# Patient Record
Sex: FEMALE | Race: BLACK OR AFRICAN AMERICAN | NOT HISPANIC OR LATINO | URBAN - METROPOLITAN AREA
[De-identification: names, ages, dates, MRNs, and addresses within clinical notes are randomized per-mention and may not be internally consistent; named-entity substitution may affect disease eponyms.]

---

## 2017-08-28 ENCOUNTER — EMERGENCY (EMERGENCY)
Facility: HOSPITAL | Age: 27
LOS: 1 days | Discharge: HOME | End: 2017-08-28
Attending: EMERGENCY MEDICINE | Admitting: EMERGENCY MEDICINE
Payer: COMMERCIAL

## 2017-08-28 VITALS
HEART RATE: 101 BPM | DIASTOLIC BLOOD PRESSURE: 89 MMHG | TEMPERATURE: 98 F | SYSTOLIC BLOOD PRESSURE: 129 MMHG | OXYGEN SATURATION: 100 % | RESPIRATION RATE: 16 BRPM

## 2017-08-28 LAB
ALBUMIN SERPL ELPH-MCNC: 4 G/DL — SIGNIFICANT CHANGE UP (ref 3.3–5)
ALP SERPL-CCNC: 67 U/L — SIGNIFICANT CHANGE UP (ref 40–120)
ALT FLD-CCNC: 21 U/L — SIGNIFICANT CHANGE UP (ref 4–33)
APPEARANCE UR: SIGNIFICANT CHANGE UP
AST SERPL-CCNC: 24 U/L — SIGNIFICANT CHANGE UP (ref 4–32)
BACTERIA # UR AUTO: SIGNIFICANT CHANGE UP
BASE EXCESS BLDV CALC-SCNC: -0.4 MMOL/L — SIGNIFICANT CHANGE UP
BASOPHILS # BLD AUTO: 0.03 K/UL — SIGNIFICANT CHANGE UP (ref 0–0.2)
BASOPHILS NFR BLD AUTO: 0.4 % — SIGNIFICANT CHANGE UP (ref 0–2)
BILIRUB SERPL-MCNC: 0.5 MG/DL — SIGNIFICANT CHANGE UP (ref 0.2–1.2)
BILIRUB UR-MCNC: NEGATIVE — SIGNIFICANT CHANGE UP
BLOOD GAS VENOUS - CREATININE: 0.62 MG/DL — SIGNIFICANT CHANGE UP (ref 0.5–1.3)
BLOOD UR QL VISUAL: HIGH
BUN SERPL-MCNC: 10 MG/DL — SIGNIFICANT CHANGE UP (ref 7–23)
CALCIUM SERPL-MCNC: 9.4 MG/DL — SIGNIFICANT CHANGE UP (ref 8.4–10.5)
CHLORIDE BLDV-SCNC: 104 MMOL/L — SIGNIFICANT CHANGE UP (ref 96–108)
CHLORIDE SERPL-SCNC: 98 MMOL/L — SIGNIFICANT CHANGE UP (ref 98–107)
CO2 SERPL-SCNC: 21 MMOL/L — LOW (ref 22–31)
COLOR SPEC: YELLOW — SIGNIFICANT CHANGE UP
CREAT SERPL-MCNC: 0.68 MG/DL — SIGNIFICANT CHANGE UP (ref 0.5–1.3)
EOSINOPHIL # BLD AUTO: 0.29 K/UL — SIGNIFICANT CHANGE UP (ref 0–0.5)
EOSINOPHIL NFR BLD AUTO: 3.6 % — SIGNIFICANT CHANGE UP (ref 0–6)
GAS PNL BLDV: 133 MMOL/L — LOW (ref 136–146)
GLUCOSE BLDV-MCNC: 101 — HIGH (ref 70–99)
GLUCOSE SERPL-MCNC: 90 MG/DL — SIGNIFICANT CHANGE UP (ref 70–99)
GLUCOSE UR-MCNC: NEGATIVE — SIGNIFICANT CHANGE UP
HCO3 BLDV-SCNC: 22 MMOL/L — SIGNIFICANT CHANGE UP (ref 20–27)
HCT VFR BLD CALC: 41.5 % — SIGNIFICANT CHANGE UP (ref 34.5–45)
HCT VFR BLDV CALC: 43.3 % — SIGNIFICANT CHANGE UP (ref 34.5–45)
HGB BLD-MCNC: 13.5 G/DL — SIGNIFICANT CHANGE UP (ref 11.5–15.5)
HGB BLDV-MCNC: 14.1 G/DL — SIGNIFICANT CHANGE UP (ref 11.5–15.5)
IMM GRANULOCYTES # BLD AUTO: 0.01 # — SIGNIFICANT CHANGE UP
IMM GRANULOCYTES NFR BLD AUTO: 0.1 % — SIGNIFICANT CHANGE UP (ref 0–1.5)
KETONES UR-MCNC: SIGNIFICANT CHANGE UP
LACTATE BLDV-MCNC: 1.3 MMOL/L — SIGNIFICANT CHANGE UP (ref 0.5–2)
LEUKOCYTE ESTERASE UR-ACNC: HIGH
LYMPHOCYTES # BLD AUTO: 2.53 K/UL — SIGNIFICANT CHANGE UP (ref 1–3.3)
LYMPHOCYTES # BLD AUTO: 31.7 % — SIGNIFICANT CHANGE UP (ref 13–44)
MCHC RBC-ENTMCNC: 30.7 PG — SIGNIFICANT CHANGE UP (ref 27–34)
MCHC RBC-ENTMCNC: 32.5 % — SIGNIFICANT CHANGE UP (ref 32–36)
MCV RBC AUTO: 94.3 FL — SIGNIFICANT CHANGE UP (ref 80–100)
MONOCYTES # BLD AUTO: 0.42 K/UL — SIGNIFICANT CHANGE UP (ref 0–0.9)
MONOCYTES NFR BLD AUTO: 5.3 % — SIGNIFICANT CHANGE UP (ref 2–14)
MUCOUS THREADS # UR AUTO: SIGNIFICANT CHANGE UP
NEUTROPHILS # BLD AUTO: 4.69 K/UL — SIGNIFICANT CHANGE UP (ref 1.8–7.4)
NEUTROPHILS NFR BLD AUTO: 58.9 % — SIGNIFICANT CHANGE UP (ref 43–77)
NITRITE UR-MCNC: NEGATIVE — SIGNIFICANT CHANGE UP
NRBC # FLD: 0 — SIGNIFICANT CHANGE UP
PCO2 BLDV: 49 MMHG — SIGNIFICANT CHANGE UP (ref 41–51)
PH BLDV: 7.32 PH — SIGNIFICANT CHANGE UP (ref 7.32–7.43)
PH UR: 6 — SIGNIFICANT CHANGE UP (ref 4.6–8)
PLATELET # BLD AUTO: 279 K/UL — SIGNIFICANT CHANGE UP (ref 150–400)
PMV BLD: 11.8 FL — SIGNIFICANT CHANGE UP (ref 7–13)
PO2 BLDV: 28 MMHG — LOW (ref 35–40)
POTASSIUM BLDV-SCNC: 3.8 MMOL/L — SIGNIFICANT CHANGE UP (ref 3.4–4.5)
POTASSIUM SERPL-MCNC: 4 MMOL/L — SIGNIFICANT CHANGE UP (ref 3.5–5.3)
POTASSIUM SERPL-SCNC: 4 MMOL/L — SIGNIFICANT CHANGE UP (ref 3.5–5.3)
PROT SERPL-MCNC: 7.7 G/DL — SIGNIFICANT CHANGE UP (ref 6–8.3)
PROT UR-MCNC: 300 — HIGH
RBC # BLD: 4.4 M/UL — SIGNIFICANT CHANGE UP (ref 3.8–5.2)
RBC # FLD: 12.9 % — SIGNIFICANT CHANGE UP (ref 10.3–14.5)
RBC CASTS # UR COMP ASSIST: >50 — HIGH (ref 0–?)
SAO2 % BLDV: 41.5 % — LOW (ref 60–85)
SODIUM SERPL-SCNC: 134 MMOL/L — LOW (ref 135–145)
SP GR SPEC: 1.02 — SIGNIFICANT CHANGE UP (ref 1–1.03)
SQUAMOUS # UR AUTO: SIGNIFICANT CHANGE UP
UROBILINOGEN FLD QL: NORMAL E.U. — SIGNIFICANT CHANGE UP (ref 0.1–0.2)
WBC # BLD: 7.97 K/UL — SIGNIFICANT CHANGE UP (ref 3.8–10.5)
WBC # FLD AUTO: 7.97 K/UL — SIGNIFICANT CHANGE UP (ref 3.8–10.5)
WBC UR QL: SIGNIFICANT CHANGE UP (ref 0–?)

## 2017-08-28 PROCEDURE — 74176 CT ABD & PELVIS W/O CONTRAST: CPT | Mod: 26,GC

## 2017-08-28 PROCEDURE — 76830 TRANSVAGINAL US NON-OB: CPT | Mod: 26

## 2017-08-28 PROCEDURE — 99284 EMERGENCY DEPT VISIT MOD MDM: CPT | Mod: 25

## 2017-08-28 RX ORDER — KETOROLAC TROMETHAMINE 30 MG/ML
15 SYRINGE (ML) INJECTION ONCE
Qty: 0 | Refills: 0 | Status: DISCONTINUED | OUTPATIENT
Start: 2017-08-28 | End: 2017-08-28

## 2017-08-28 RX ORDER — ACETAMINOPHEN 500 MG
2 TABLET ORAL
Qty: 60 | Refills: 0 | OUTPATIENT
Start: 2017-08-28 | End: 2017-09-02

## 2017-08-28 RX ORDER — CEFTRIAXONE 500 MG/1
1 INJECTION, POWDER, FOR SOLUTION INTRAMUSCULAR; INTRAVENOUS ONCE
Qty: 0 | Refills: 0 | Status: COMPLETED | OUTPATIENT
Start: 2017-08-28 | End: 2017-08-28

## 2017-08-28 RX ORDER — ACETAMINOPHEN 500 MG
1000 TABLET ORAL ONCE
Qty: 0 | Refills: 0 | Status: COMPLETED | OUTPATIENT
Start: 2017-08-28 | End: 2017-08-28

## 2017-08-28 RX ORDER — SODIUM CHLORIDE 9 MG/ML
1000 INJECTION INTRAMUSCULAR; INTRAVENOUS; SUBCUTANEOUS ONCE
Qty: 0 | Refills: 0 | Status: COMPLETED | OUTPATIENT
Start: 2017-08-28 | End: 2017-08-28

## 2017-08-28 RX ADMIN — Medication 15 MILLIGRAM(S): at 06:39

## 2017-08-28 RX ADMIN — CEFTRIAXONE 100 GRAM(S): 500 INJECTION, POWDER, FOR SOLUTION INTRAMUSCULAR; INTRAVENOUS at 04:26

## 2017-08-28 RX ADMIN — Medication 400 MILLIGRAM(S): at 02:47

## 2017-08-28 RX ADMIN — Medication 1000 MILLIGRAM(S): at 04:26

## 2017-08-28 RX ADMIN — SODIUM CHLORIDE 1000 MILLILITER(S): 9 INJECTION INTRAMUSCULAR; INTRAVENOUS; SUBCUTANEOUS at 02:43

## 2017-08-28 NOTE — ED ADULT TRIAGE NOTE - CHIEF COMPLAINT QUOTE
pt was seen at [facility] today and prescribed macrobid for UTI. c.o left flank pain starting 1 hour ago. states urine has been bloody for the last few hours. took 650mg tylenol at 9pm. pmh asthma

## 2017-08-28 NOTE — ED ADULT NURSE NOTE - CHIEF COMPLAINT QUOTE
pt was seen at [facility] today and prescribed macrobid for UTI. c.o left flank pain starting 1 hour ago. states urine has been bloody for the last few hours. took 650mg tylenol at 9pm. pmh asthma

## 2017-08-28 NOTE — ED ADULT NURSE NOTE - OBJECTIVE STATEMENT
Patient bought in room 9, alert and oriented x 4, ambulatory, respirations are even and unlabored, left lower  to palpation, 20 gauge saline lock inserted on right AC, blood drawn and sent, urinalysis and urine culture sent. Medications administered as ordered. Will follow up and monitor.

## 2017-08-28 NOTE — ED PROVIDER NOTE - OBJECTIVE STATEMENT
27 YOF asthma and eczema recent UTI Dx at  today p/w left flank pain and hematuria since her visit earlier today.  No associated symptoms. No fevers, chills, sweats, weight loss, fatigue, or malaise.  No history of urolithiasis.  Pt has blood tinged urine at bedside.

## 2017-08-28 NOTE — ED PROVIDER NOTE - ATTENDING CONTRIBUTION TO CARE
DR. GUERRERO, ATTENDING MD-  I performed a face to face bedside interview with patient regarding history of present illness, review of symptoms and past medical history. I completed an independent physical exam.  I have discussed patient's plan of care with the resident.   Documentation as above in the note.   HPI: 28 yo F with asthma that presents with left flank pain. Currently being treated for UTI which she is taking macrobid from UC today. Hematuria as well. Flank pain colicky 9/10, on/off, radiates to groin.   EXAM: Well appearing, CVAT Left. Abd otherwise soft.   MDM: Worry for kidney stone vs pyelo. Will obtain labs, urine, and CT. Provide meds, reassess. DR. GUERRERO, ATTENDING MD-  I performed a face to face bedside interview with patient regarding history of present illness, review of symptoms and past medical history. I completed an independent physical exam.  I have discussed patient's plan of care with the resident.   Documentation as above in the note.   HPI: 28 yo F with asthma that presents with left flank pain. Currently being treated for UTI which she is taking macrobid from  today. Hematuria as well. Flank pain colicky 9/10, on/off, radiates to groin.   EXAM: Well appearing, CVAT Left. Abd otherwise soft.   MDM: Worry for kidney stone vs pyelo. Will obtain labs, urine, and CT. Provide meds, reassess.  CT without evidence stone. US shows no torsion but hemorrhagic cyst. Pt has OBGYN that she can f/u with. Will provide all labs and imaging results to pt to bring to PMD, OBGYN and rec to continue Abx given at  and rx'd pain meds PRN. return precautions.

## 2017-08-28 NOTE — ED PROVIDER NOTE - MEDICAL DECISION MAKING DETAILS
27 YOF asthma and eczema recent UTI Dx at  today p/w left flank pain and hematuria since her visit earlier today.  Tachycardia.  SIRS 1+.  +CVAT.  DDX UTI, pyelonephritis, urolithiasis, infected nephrolith.  CT renal stone hunt, evaluate for infection, evaluate renal function.  Treat symptomatically, resuscitate.  Reassess.

## 2017-08-28 NOTE — ED PROVIDER NOTE - CARE PLAN
Principal Discharge DX:	Hematuria Principal Discharge DX:	Ovarian cyst  Secondary Diagnosis:	Acute cystitis with hematuria

## 2017-08-29 LAB
BACTERIA UR CULT: SIGNIFICANT CHANGE UP
SPECIMEN SOURCE: SIGNIFICANT CHANGE UP

## 2018-04-06 ENCOUNTER — EMERGENCY (EMERGENCY)
Facility: HOSPITAL | Age: 28
LOS: 1 days | Discharge: HOME | End: 2018-04-06
Attending: EMERGENCY MEDICINE | Admitting: EMERGENCY MEDICINE
Payer: COMMERCIAL

## 2018-04-06 VITALS
SYSTOLIC BLOOD PRESSURE: 108 MMHG | TEMPERATURE: 98 F | HEART RATE: 95 BPM | DIASTOLIC BLOOD PRESSURE: 67 MMHG | RESPIRATION RATE: 18 BRPM

## 2018-04-06 LAB
ALBUMIN SERPL ELPH-MCNC: 3.2 G/DL — LOW (ref 3.3–5)
ALP SERPL-CCNC: 81 U/L — SIGNIFICANT CHANGE UP (ref 40–120)
ALT FLD-CCNC: 18 U/L — SIGNIFICANT CHANGE UP (ref 4–33)
AST SERPL-CCNC: 19 U/L — SIGNIFICANT CHANGE UP (ref 4–32)
BASE EXCESS BLDV CALC-SCNC: 1.7 MMOL/L — SIGNIFICANT CHANGE UP
BASOPHILS # BLD AUTO: 0.02 K/UL — SIGNIFICANT CHANGE UP (ref 0–0.2)
BASOPHILS NFR BLD AUTO: 0.2 % — SIGNIFICANT CHANGE UP (ref 0–2)
BILIRUB SERPL-MCNC: 0.4 MG/DL — SIGNIFICANT CHANGE UP (ref 0.2–1.2)
BLOOD GAS VENOUS - CREATININE: 0.57 MG/DL — SIGNIFICANT CHANGE UP (ref 0.5–1.3)
BUN SERPL-MCNC: 8 MG/DL — SIGNIFICANT CHANGE UP (ref 7–23)
CALCIUM SERPL-MCNC: 8.8 MG/DL — SIGNIFICANT CHANGE UP (ref 8.4–10.5)
CHLORIDE BLDV-SCNC: 104 MMOL/L — SIGNIFICANT CHANGE UP (ref 96–108)
CHLORIDE SERPL-SCNC: 101 MMOL/L — SIGNIFICANT CHANGE UP (ref 98–107)
CO2 SERPL-SCNC: 24 MMOL/L — SIGNIFICANT CHANGE UP (ref 22–31)
CREAT SERPL-MCNC: 0.75 MG/DL — SIGNIFICANT CHANGE UP (ref 0.5–1.3)
EOSINOPHIL # BLD AUTO: 0.17 K/UL — SIGNIFICANT CHANGE UP (ref 0–0.5)
EOSINOPHIL NFR BLD AUTO: 1.4 % — SIGNIFICANT CHANGE UP (ref 0–6)
GAS PNL BLDV: 136 MMOL/L — SIGNIFICANT CHANGE UP (ref 136–146)
GLUCOSE BLDV-MCNC: 122 — HIGH (ref 70–99)
GLUCOSE SERPL-MCNC: 126 MG/DL — HIGH (ref 70–99)
HCG SERPL-ACNC: < 5 MIU/ML — SIGNIFICANT CHANGE UP
HCO3 BLDV-SCNC: 24 MMOL/L — SIGNIFICANT CHANGE UP (ref 20–27)
HCT VFR BLD CALC: 36.9 % — SIGNIFICANT CHANGE UP (ref 34.5–45)
HCT VFR BLDV CALC: 40.6 % — SIGNIFICANT CHANGE UP (ref 34.5–45)
HGB BLD-MCNC: 11.8 G/DL — SIGNIFICANT CHANGE UP (ref 11.5–15.5)
HGB BLDV-MCNC: 13.2 G/DL — SIGNIFICANT CHANGE UP (ref 11.5–15.5)
IMM GRANULOCYTES # BLD AUTO: 0.05 # — SIGNIFICANT CHANGE UP
IMM GRANULOCYTES NFR BLD AUTO: 0.4 % — SIGNIFICANT CHANGE UP (ref 0–1.5)
LACTATE BLDV-MCNC: 2.1 MMOL/L — HIGH (ref 0.5–2)
LYMPHOCYTES # BLD AUTO: 29.7 % — SIGNIFICANT CHANGE UP (ref 13–44)
LYMPHOCYTES # BLD AUTO: 3.5 K/UL — HIGH (ref 1–3.3)
MCHC RBC-ENTMCNC: 30.6 PG — SIGNIFICANT CHANGE UP (ref 27–34)
MCHC RBC-ENTMCNC: 32 % — SIGNIFICANT CHANGE UP (ref 32–36)
MCV RBC AUTO: 95.6 FL — SIGNIFICANT CHANGE UP (ref 80–100)
MONOCYTES # BLD AUTO: 0.68 K/UL — SIGNIFICANT CHANGE UP (ref 0–0.9)
MONOCYTES NFR BLD AUTO: 5.8 % — SIGNIFICANT CHANGE UP (ref 2–14)
NEUTROPHILS # BLD AUTO: 7.36 K/UL — SIGNIFICANT CHANGE UP (ref 1.8–7.4)
NEUTROPHILS NFR BLD AUTO: 62.5 % — SIGNIFICANT CHANGE UP (ref 43–77)
NRBC # FLD: 0 — SIGNIFICANT CHANGE UP
PCO2 BLDV: 46 MMHG — SIGNIFICANT CHANGE UP (ref 41–51)
PH BLDV: 7.38 PH — SIGNIFICANT CHANGE UP (ref 7.32–7.43)
PLATELET # BLD AUTO: 352 K/UL — SIGNIFICANT CHANGE UP (ref 150–400)
PMV BLD: 10.9 FL — SIGNIFICANT CHANGE UP (ref 7–13)
PO2 BLDV: 29 MMHG — LOW (ref 35–40)
POTASSIUM BLDV-SCNC: 3.8 MMOL/L — SIGNIFICANT CHANGE UP (ref 3.4–4.5)
POTASSIUM SERPL-MCNC: 4 MMOL/L — SIGNIFICANT CHANGE UP (ref 3.5–5.3)
POTASSIUM SERPL-SCNC: 4 MMOL/L — SIGNIFICANT CHANGE UP (ref 3.5–5.3)
PROT SERPL-MCNC: 7 G/DL — SIGNIFICANT CHANGE UP (ref 6–8.3)
RBC # BLD: 3.86 M/UL — SIGNIFICANT CHANGE UP (ref 3.8–5.2)
RBC # FLD: 13.1 % — SIGNIFICANT CHANGE UP (ref 10.3–14.5)
SAO2 % BLDV: 47.7 % — LOW (ref 60–85)
SODIUM SERPL-SCNC: 138 MMOL/L — SIGNIFICANT CHANGE UP (ref 135–145)
WBC # BLD: 11.78 K/UL — HIGH (ref 3.8–10.5)
WBC # FLD AUTO: 11.78 K/UL — HIGH (ref 3.8–10.5)

## 2018-04-06 PROCEDURE — 99285 EMERGENCY DEPT VISIT HI MDM: CPT | Mod: 25

## 2018-04-06 PROCEDURE — 76641 ULTRASOUND BREAST COMPLETE: CPT | Mod: 26,RT

## 2018-04-06 RX ORDER — OXYCODONE AND ACETAMINOPHEN 5; 325 MG/1; MG/1
1 TABLET ORAL ONCE
Qty: 0 | Refills: 0 | Status: DISCONTINUED | OUTPATIENT
Start: 2018-04-06 | End: 2018-04-06

## 2018-04-06 RX ORDER — OXYCODONE AND ACETAMINOPHEN 5; 325 MG/1; MG/1
1 TABLET ORAL EVERY 4 HOURS
Qty: 0 | Refills: 0 | Status: DISCONTINUED | OUTPATIENT
Start: 2018-04-06 | End: 2018-04-06

## 2018-04-06 RX ADMIN — OXYCODONE AND ACETAMINOPHEN 1 TABLET(S): 5; 325 TABLET ORAL at 21:45

## 2018-04-06 RX ADMIN — OXYCODONE AND ACETAMINOPHEN 1 TABLET(S): 5; 325 TABLET ORAL at 22:45

## 2018-04-06 NOTE — ED ADULT NURSE NOTE - CHIEF COMPLAINT
The patient is a 27y Female complaining of mass on right breast noticed x2 days with pain and discharge from nipple around piercing. Reports was told she needed a biopsy.

## 2018-04-06 NOTE — ED ADULT NURSE NOTE - OBJECTIVE STATEMENT
Received pt in intake 9, pt A&Ox4, respirations even and unlabored b/l. +large mass on right breast. IVL 20g Angiocath placed on left AC. labs sent. Medicated as per provider order. Will continue to monitor.

## 2018-04-06 NOTE — ED ADULT TRIAGE NOTE - CHIEF COMPLAINT QUOTE
Pt found a mass in her rt breast 3 days ago and went to [facility] who then sent her for a sonogram yesterday which showed a large mass. Pt was scheduled for biopsy this am and missed the appt  Pt states she is worried and the pain in the breast is increasing

## 2018-04-06 NOTE — ED PROVIDER NOTE - OBJECTIVE STATEMENT
Pt is a 26 y/o F w/ NKDA, no medications, no PMHX, no PSHX who presents w/ a painful mass on her R breast x2 days. She notes associated chills, nausea, and vomiting that started today. Pt has a piercing to the R nipple and reports that there is discharge coming from the nipple. She had a sonogram yesterday that found a large mass and was recommended to get a biopsy. Denies fever or other complaints. 26 y/o F no PMHX presents w/ a painful mass on her R breast x2 days. She notes associated chills, and nausea that started today. Pt has a piercing to the R nipple and reports that there is discharge coming from the nipple. She had a sonogram yesterday that found a large mass? and was recommended to get a biopsy today. Pt missed appointment this morning. Pt presents to ED for further eval.  Denies fever or other complaints.

## 2018-04-06 NOTE — ED PROVIDER NOTE - PROGRESS NOTE DETAILS
SINA Elizabeth- received sign out from SINA Shah.  Breast US showing fluid collection- Surgery called for consult and at bedside. SINA Smith- Surgery still at bedside. Signed out to CDU SINA Guevara to follow up on DC plan SINA Smith- Surgery needle aspirated- obtained a good amount of fluids. Recommends CDU overnight for IV ABX and reassessment in am.  Spoke with SINA Guevara- accepted to CDU. Clinda ordered.

## 2018-04-06 NOTE — ED PROVIDER NOTE - PHYSICAL EXAMINATION
Chaperon- SINA Jang  Right Breast: positive tenderness to breast with positive mass to inferior breast at around 6 oclock, no nipple discharge noted, positive induration

## 2018-04-06 NOTE — ED PROVIDER NOTE - ATTENDING CONTRIBUTION TO CARE
27F no sig pmh with painful mass R breast x 2 days with chills, nausea. +Purulent discharge from around nipple ring. +FH breast CA in cousin and aunt.    PE: NAD, NCAT, MMM, Trachea midline, Normal conjunctiva, lungs CTAB, S1/S2 RRR, Normal perfusion, 2+ radial pulses bilat, Abdomen Soft, NTND, No rebound/guarding, No LE edema, No deformity of extremities, No rashes,  No focal motor or sensory deficits. R breast with area of firmness posterior to and inferior to areola, no discharge, no erythema, no warmth (beulah Shah).    27F with R breast region firmness, pain. Concerning for abscess vs mass. To obtain CBC eval for anemia, leukocytosis, cmp eval for metabolic derangement. Draw blood cultures. US breast. Analgesia. Likely surgery consult. - Donald Le MD

## 2018-04-06 NOTE — ED PROVIDER NOTE - MEDICAL DECISION MAKING DETAILS
26 y/o F no PMHX presents w/ a painful mass on her R breast x2 days. : r/o abscess vs mass: cbc, cmp, pain control, US

## 2018-04-07 VITALS
OXYGEN SATURATION: 100 % | HEART RATE: 92 BPM | SYSTOLIC BLOOD PRESSURE: 100 MMHG | RESPIRATION RATE: 16 BRPM | TEMPERATURE: 98 F | DIASTOLIC BLOOD PRESSURE: 68 MMHG

## 2018-04-07 LAB
ALBUMIN SERPL ELPH-MCNC: 2.9 G/DL — LOW (ref 3.3–5)
ALP SERPL-CCNC: 69 U/L — SIGNIFICANT CHANGE UP (ref 40–120)
ALT FLD-CCNC: 15 U/L — SIGNIFICANT CHANGE UP (ref 4–33)
AST SERPL-CCNC: 13 U/L — SIGNIFICANT CHANGE UP (ref 4–32)
BASE EXCESS BLDV CALC-SCNC: 1.8 MMOL/L — SIGNIFICANT CHANGE UP
BASOPHILS # BLD AUTO: 0.02 K/UL — SIGNIFICANT CHANGE UP (ref 0–0.2)
BASOPHILS NFR BLD AUTO: 0.2 % — SIGNIFICANT CHANGE UP (ref 0–2)
BILIRUB SERPL-MCNC: 0.5 MG/DL — SIGNIFICANT CHANGE UP (ref 0.2–1.2)
BLOOD GAS VENOUS - CREATININE: 0.82 MG/DL — SIGNIFICANT CHANGE UP (ref 0.5–1.3)
BUN SERPL-MCNC: 12 MG/DL — SIGNIFICANT CHANGE UP (ref 7–23)
CALCIUM SERPL-MCNC: 8.5 MG/DL — SIGNIFICANT CHANGE UP (ref 8.4–10.5)
CHLORIDE BLDV-SCNC: 104 MMOL/L — SIGNIFICANT CHANGE UP (ref 96–108)
CHLORIDE SERPL-SCNC: 101 MMOL/L — SIGNIFICANT CHANGE UP (ref 98–107)
CO2 SERPL-SCNC: 23 MMOL/L — SIGNIFICANT CHANGE UP (ref 22–31)
CREAT SERPL-MCNC: 0.87 MG/DL — SIGNIFICANT CHANGE UP (ref 0.5–1.3)
EOSINOPHIL # BLD AUTO: 0.23 K/UL — SIGNIFICANT CHANGE UP (ref 0–0.5)
EOSINOPHIL NFR BLD AUTO: 1.9 % — SIGNIFICANT CHANGE UP (ref 0–6)
GAS PNL BLDV: 131 MMOL/L — LOW (ref 136–146)
GLUCOSE BLDV-MCNC: 112 — HIGH (ref 70–99)
GLUCOSE SERPL-MCNC: 124 MG/DL — HIGH (ref 70–99)
GRAM STN SPEC: SIGNIFICANT CHANGE UP
HBA1C BLD-MCNC: 5 % — SIGNIFICANT CHANGE UP (ref 4–5.6)
HCO3 BLDV-SCNC: 25 MMOL/L — SIGNIFICANT CHANGE UP (ref 20–27)
HCT VFR BLD CALC: 33.4 % — LOW (ref 34.5–45)
HCT VFR BLDV CALC: 33.9 % — LOW (ref 34.5–45)
HGB BLD-MCNC: 10.9 G/DL — LOW (ref 11.5–15.5)
HGB BLDV-MCNC: 11 G/DL — LOW (ref 11.5–15.5)
IMM GRANULOCYTES # BLD AUTO: 0.04 # — SIGNIFICANT CHANGE UP
IMM GRANULOCYTES NFR BLD AUTO: 0.3 % — SIGNIFICANT CHANGE UP (ref 0–1.5)
LACTATE BLDV-MCNC: 0.9 MMOL/L — SIGNIFICANT CHANGE UP (ref 0.5–2)
LYMPHOCYTES # BLD AUTO: 35 % — SIGNIFICANT CHANGE UP (ref 13–44)
LYMPHOCYTES # BLD AUTO: 4.17 K/UL — HIGH (ref 1–3.3)
MCHC RBC-ENTMCNC: 31.5 PG — SIGNIFICANT CHANGE UP (ref 27–34)
MCHC RBC-ENTMCNC: 32.6 % — SIGNIFICANT CHANGE UP (ref 32–36)
MCV RBC AUTO: 96.5 FL — SIGNIFICANT CHANGE UP (ref 80–100)
MONOCYTES # BLD AUTO: 0.9 K/UL — SIGNIFICANT CHANGE UP (ref 0–0.9)
MONOCYTES NFR BLD AUTO: 7.6 % — SIGNIFICANT CHANGE UP (ref 2–14)
NEUTROPHILS # BLD AUTO: 6.54 K/UL — SIGNIFICANT CHANGE UP (ref 1.8–7.4)
NEUTROPHILS NFR BLD AUTO: 55 % — SIGNIFICANT CHANGE UP (ref 43–77)
NRBC # FLD: 0 — SIGNIFICANT CHANGE UP
PCO2 BLDV: 51 MMHG — SIGNIFICANT CHANGE UP (ref 41–51)
PH BLDV: 7.34 PH — SIGNIFICANT CHANGE UP (ref 7.32–7.43)
PLATELET # BLD AUTO: 305 K/UL — SIGNIFICANT CHANGE UP (ref 150–400)
PMV BLD: 10.8 FL — SIGNIFICANT CHANGE UP (ref 7–13)
PO2 BLDV: 48 MMHG — HIGH (ref 35–40)
POTASSIUM BLDV-SCNC: 3.7 MMOL/L — SIGNIFICANT CHANGE UP (ref 3.4–4.5)
POTASSIUM SERPL-MCNC: 3.9 MMOL/L — SIGNIFICANT CHANGE UP (ref 3.5–5.3)
POTASSIUM SERPL-SCNC: 3.9 MMOL/L — SIGNIFICANT CHANGE UP (ref 3.5–5.3)
PROT SERPL-MCNC: 6.1 G/DL — SIGNIFICANT CHANGE UP (ref 6–8.3)
RBC # BLD: 3.46 M/UL — LOW (ref 3.8–5.2)
RBC # FLD: 12.8 % — SIGNIFICANT CHANGE UP (ref 10.3–14.5)
SAO2 % BLDV: 76.8 % — SIGNIFICANT CHANGE UP (ref 60–85)
SODIUM SERPL-SCNC: 136 MMOL/L — SIGNIFICANT CHANGE UP (ref 135–145)
SPECIMEN SOURCE: SIGNIFICANT CHANGE UP
WBC # BLD: 11.9 K/UL — HIGH (ref 3.8–10.5)
WBC # FLD AUTO: 11.9 K/UL — HIGH (ref 3.8–10.5)

## 2018-04-07 PROCEDURE — 99219: CPT

## 2018-04-07 RX ORDER — OXYCODONE AND ACETAMINOPHEN 5; 325 MG/1; MG/1
1 TABLET ORAL ONCE
Qty: 0 | Refills: 0 | Status: DISCONTINUED | OUTPATIENT
Start: 2018-04-07 | End: 2018-04-07

## 2018-04-07 RX ORDER — IBUPROFEN 200 MG
1 TABLET ORAL
Qty: 21 | Refills: 0 | OUTPATIENT
Start: 2018-04-07 | End: 2018-04-13

## 2018-04-07 RX ORDER — KETOROLAC TROMETHAMINE 30 MG/ML
30 SYRINGE (ML) INJECTION ONCE
Qty: 0 | Refills: 0 | Status: DISCONTINUED | OUTPATIENT
Start: 2018-04-07 | End: 2018-04-07

## 2018-04-07 RX ADMIN — Medication 30 MILLIGRAM(S): at 02:37

## 2018-04-07 RX ADMIN — Medication 100 MILLIGRAM(S): at 07:02

## 2018-04-07 RX ADMIN — OXYCODONE AND ACETAMINOPHEN 1 TABLET(S): 5; 325 TABLET ORAL at 04:30

## 2018-04-07 RX ADMIN — OXYCODONE AND ACETAMINOPHEN 1 TABLET(S): 5; 325 TABLET ORAL at 03:59

## 2018-04-07 RX ADMIN — Medication 30 MILLIGRAM(S): at 00:23

## 2018-04-07 RX ADMIN — Medication 100 MILLIGRAM(S): at 02:42

## 2018-04-07 NOTE — ED CDU PROVIDER DISPOSITION NOTE - PLAN OF CARE
Take medications as prescribed. Follow up with Breast Surgeon Dr. Manning ([address] [place] [address], [place], [phone]) about this issue (these issues): right breast abscess. Return if symptoms worsen or do not improve; particularly if fever or increased pain, swelling, pus. Return if pain not controlled with oral medications.

## 2018-04-07 NOTE — ED CDU PROVIDER DISPOSITION NOTE - CLINICAL COURSE
Yocasta: Improved overnight after drainage and on Clinda IV. Seen by Gen Surg; eligible for discharge on Abx w/ Breast Surgeon f/u. Will give one last dose of Clinda IV and Discharge home.

## 2018-04-07 NOTE — ED CDU PROVIDER INITIAL DAY NOTE - INDICATION FOR OBSERVATION
IV antibiotics per orders, analgesia PRN, am labs, Surgery team reassessment, general observation and reassessment./Therapeutic Intensity

## 2018-04-07 NOTE — CONSULT NOTE ADULT - ASSESSMENT
26y/o female with Right breast abscess vs mass    - Right breast abscess aspirated with return of 5-7cc of foul smelling purulent fluid  - Recommend admission to CDU for several doses of IV abx. Will reassess in AM with exam/repeat labs & monitor for fever prior to determining need for formal I&D vs outpatient f/u with po abx      -Edgar Dunn, PGY-4  p [phone]

## 2018-04-07 NOTE — ED CDU PROVIDER INITIAL DAY NOTE - ATTENDING CONTRIBUTION TO CARE
27F no sig pmh with painful mass R breast x 2 days with chills, nausea. +Purulent discharge from around nipple ring. US in ED shows complex fluid collection c/w abscess. Pt evaluated by surgery, who performed aspiration, recommended obs in CDU overnight with abx administration, to re-eval in morning.     PE: NAD, NCAT, MMM, Trachea midline, Normal conjunctiva, lungs CTAB, S1/S2 RRR, Normal perfusion, 2+ radial pulses bilat, Abdomen Soft, NTND, No rebound/guarding, No LE edema, No deformity of extremities, No rashes,  No focal motor or sensory deficits. R breast with area of firmness posterior to and inferior to areola, no discharge, no erythema, no warmth (beulah Shah).    27F with R breast abscess. Evaluated by surgery who recommends IV abx and obs overnight s/p aspiration. Analgesia as needed. Clindamycin. Re-assess in AM. - Donald Le MD

## 2018-04-07 NOTE — ED CDU PROVIDER INITIAL DAY NOTE - FAMILY HISTORY
Aunt  Still living? Unknown  Family history of breast cancer, Age at diagnosis: Age Unknown     Father  Still living? Unknown  Family history of diabetes mellitus, Age at diagnosis: Age Unknown

## 2018-04-07 NOTE — CONSULT NOTE ADULT - SUBJECTIVE AND OBJECTIVE BOX
CC: Patient is a 27y old  Female who presents with a chief complaint of Right breast swelling & pain      HPI:  28y/o female with h/o Right nipple piercing (July 2017) now p/w 2 days of Right breast swelling & pain associated with intermittent chills today & foul smelling yellowish discharge from her nipple. She denies prior similar episodes. On exam her Right breast has a large palpable mass deep to the areola which seems to be fluctuant (evaluation limited by size/surrounding induration of area). Nipple ring is in place; no drainage noted. No surrounding erythema/cellulitis. WBC = 11.8  U/S showsa 4x3x3 cm complex collection in the Right breast      PMH  As above;   Asthma  Eczema      PSH  No significant past surgical history    MEDS  Pt takes no daily medications      Allergies  No Known Drug Allergies  shellfish (Hives)      Physical Exam  T(C): 36.8 (04-06-18 @ 18:28), Max: 36.8 (04-06-18 @ 18:28)  HR: 95 (04-06-18 @ 18:28) (95 - 95)  BP: 108/67 (04-06-18 @ 18:28) (108/67 - 108/67)  RR: 18 (04-06-18 @ 18:28) (18 - 18)  SpO2: --  Wt(kg): --  Tmax: T(C): , Max: 36.8 (04-06-18 @ 18:28)  Wt(kg): --    Gen: NAD  HEENT: normocephalic, atraumatic, no scleral icterus  CV: S1, S2, RRR  Breast: Right breast has a large palpable mass deep to the areola which seems to be fluctuant (evaluation limited by size/surrounding induration of area). Nipple ring is in place; no drainage noted. No surrounding erythema/cellulitis.  Pulm: CTA B/L  Abd: Soft, ND, NTP, no rebound, no guarding, no palpable organomegaly/masses  Ext: warm, no edema, palp dp/pt      Labs:                        11.8   11.78 )-----------( 352      ( 06 Apr 2018 22:18 )             36.9     04-06    138  |  101  |  8   ----------------------------<  126<H>  4.0   |  24  |  0.75    Ca    8.8      06 Apr 2018 22:18    TPro  7.0  /  Alb  3.2<L>  /  TBili  0.4  /  DBili  x   /  AST  19  /  ALT  18  /  AlkPhos  81  04-06      Imaging  < from: US Breast Complete, Right (04.06.18 @ 23:56) >    Impression:  A 4.1 cm complex collection in the right breast, likely an abscess.   Follow-up to resolution recommended to exclude an underlying mass.    < end of copied text >

## 2018-04-07 NOTE — ED CDU PROVIDER INITIAL DAY NOTE - MEDICAL DECISION MAKING DETAILS
IV antibiotics per orders, analgesia PRN, am labs, Surgery team reassessment, general observation and reassessment.

## 2018-04-07 NOTE — ED CDU PROVIDER INITIAL DAY NOTE - PROGRESS NOTE DETAILS
Pt has been resting comfortably in the interim; no issues thus far.  Pain controlled with PO Percocet.  Pt. stable at present; will continue to monitor.  Pt. will be signed out to day CDU PA and attending at 0700 hrs.

## 2018-04-07 NOTE — ED CDU PROVIDER SUBSEQUENT DAY NOTE - HISTORY
Yocasta: Improved overnight. Seen by Gen Surg; eligible for discharge on Abx w/ Breast Surgeon f/u. Will give one last dose of Clinda IV and Discharge home.

## 2018-04-07 NOTE — ED CDU PROVIDER SUBSEQUENT DAY NOTE - PHYSICAL EXAMINATION
Appears well. NAD. Strong pulse. Respirations unlabored. Right breast with decreased redness and swelling.

## 2018-04-07 NOTE — ED CDU PROVIDER INITIAL DAY NOTE - OBJECTIVE STATEMENT
26 y/o F no PMHX presents w/ a painful mass on her R breast x2 days. She notes associated chills, and nausea that started today. Pt has a piercing to the R nipple and reports that there is discharge coming from the nipple. She had a sonogram yesterday that found a large mass? and was recommended to get a biopsy today. Pt missed appointment this morning. Pt presents to ED for further eval.  Denies fever or other complaints.    CDU SINA Guevara Note------  28 yo female, PMH of asthma, eczema, and cigarette smoking, presented to the ED for right breast painful "mass" x past 2 days as per above.  Pt. was evaluated in the ED and had a right breast ultrasound which showed "A 4.1 cm complex collection in the right breast, likely an abscess. Follow-up to resolution recommended to exclude an underlying mass.".  Surgery team was consulted and needle aspiration performed with removal of pus.  Pt. was sent to CDU for continuation of care:  IV antibiotics per orders, analgesia PRN, am labs, Surgery team reassessment, general observation and reassessment.  On CDU arrival, pt states she is still having some right breast pain to area of aspiration performed by Surgical team; no other pain/discomfort per pt.  Pt. denies other chest pain, hx/o SOB, or other c/o.  CDU Plan discussed with patient who verbalizes agreement with plan.

## 2018-04-07 NOTE — ED CDU PROVIDER SUBSEQUENT DAY NOTE - NS ED ROS FT
Decreased pain. No F. Less breast redness and swelling. Decreased pain. No F. Less R breast redness and swelling.

## 2018-04-07 NOTE — ED CDU PROVIDER INITIAL DAY NOTE - SKIN, MLM
Skin normal color for race, warm, dry and intact. No evidence of rash.  Right breast with no obviously noted erythema (pt is dark-skinned); gauze 2x2 in place with tape over area of aspiration performed prior to CDU arrival; no active bleeding or d/c from this site; gauze is clean and dry and intact over wound.

## 2018-04-08 LAB — SPECIMEN SOURCE: SIGNIFICANT CHANGE UP

## 2018-04-08 NOTE — ED POST DISCHARGE NOTE - RESULT SUMMARY
Admin SINA Cadena: pt with breast abscess. s/p IandD. Gram stain: Gram pos cocci in pairs and gram variable rods. WCX pending. pt dc on clindamycin. Plan: Admin Team to f/u final results WCX

## 2018-04-10 LAB — CULTURE RESULTS: SIGNIFICANT CHANGE UP

## 2018-04-12 LAB — BACTERIA BLD CULT: SIGNIFICANT CHANGE UP

## 2018-06-12 ENCOUNTER — EMERGENCY (EMERGENCY)
Facility: HOSPITAL | Age: 28
LOS: 1 days | Discharge: HOME | End: 2018-06-12
Attending: EMERGENCY MEDICINE | Admitting: EMERGENCY MEDICINE
Payer: COMMERCIAL

## 2018-06-12 VITALS
OXYGEN SATURATION: 100 % | DIASTOLIC BLOOD PRESSURE: 90 MMHG | TEMPERATURE: 98 F | SYSTOLIC BLOOD PRESSURE: 138 MMHG | RESPIRATION RATE: 18 BRPM | HEART RATE: 113 BPM

## 2018-06-12 VITALS
RESPIRATION RATE: 16 BRPM | OXYGEN SATURATION: 99 % | SYSTOLIC BLOOD PRESSURE: 144 MMHG | HEART RATE: 103 BPM | DIASTOLIC BLOOD PRESSURE: 79 MMHG | TEMPERATURE: 99 F

## 2018-06-12 DIAGNOSIS — F41.9 ANXIETY DISORDER, UNSPECIFIED: ICD-10-CM

## 2018-06-12 PROCEDURE — 90792 PSYCH DIAG EVAL W/MED SRVCS: CPT

## 2018-06-12 PROCEDURE — 99284 EMERGENCY DEPT VISIT MOD MDM: CPT

## 2018-06-12 RX ORDER — IPRATROPIUM/ALBUTEROL SULFATE 18-103MCG
3 AEROSOL WITH ADAPTER (GRAM) INHALATION ONCE
Qty: 0 | Refills: 0 | Status: COMPLETED | OUTPATIENT
Start: 2018-06-12 | End: 2018-06-12

## 2018-06-12 RX ORDER — FLUTICASONE PROPIONATE AND SALMETEROL 50; 250 UG/1; UG/1
2 POWDER ORAL; RESPIRATORY (INHALATION)
Qty: 1 | Refills: 0 | OUTPATIENT
Start: 2018-06-12 | End: 2018-06-23

## 2018-06-12 RX ORDER — ALBUTEROL 90 UG/1
2 AEROSOL, METERED ORAL
Qty: 1 | Refills: 0 | OUTPATIENT
Start: 2018-06-12 | End: 2018-07-11

## 2018-06-12 RX ADMIN — Medication 3 MILLILITER(S): at 17:00

## 2018-06-12 RX ADMIN — Medication 3 MILLILITER(S): at 17:05

## 2018-06-12 RX ADMIN — Medication 40 MILLIGRAM(S): at 17:10
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
## 2018-06-12 NOTE — ED BEHAVIORAL HEALTH ASSESSMENT NOTE - DIFFERENTIAL
r/o PTSD: r/o PTSD: r/o cannabis abuse; r/o substance induced mood disorder; primary insomnia; r/o depressive disorder
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
## 2018-06-12 NOTE — ED BEHAVIORAL HEALTH ASSESSMENT NOTE - OTHER PAST PSYCHIATRIC HISTORY (INCLUDE DETAILS REGARDING ONSET, COURSE OF ILLNESS, INPATIENT/OUTPATIENT TREATMENT)
Patient reports a history of anxiety, PTSD, past outpatient therapy with Luzezekiel Lu last year in 2017 for several months on a biweekly basis.  Denies history of psychiatric treatment, no history of psychiatric hospitalizations, no history of suicide attempts.

## 2018-06-12 NOTE — ED BEHAVIORAL HEALTH ASSESSMENT NOTE - OTHER
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

## 2018-06-12 NOTE — ED PROVIDER NOTE - PROGRESS NOTE DETAILS
DR Bloch- patient to be seen by psych. Meds sent to pharmacy for astma- prednisone 40 mg daily for 5 days, advair daily, proventil  as needed rescue. gomez: s/o from dr bloch.  asthma improved.  speaking in full sentences sat 985 RA, rr 18, no wheezing audible. peak flow 300. psych cleared.  very pleasant and smiling.    dx asthma exacerbation and anxiety.  rx prednisone, nebs.  f/u with pcp and outpt psych. left ambulatory.  return precautions given. gomez: s/o from dr bloch.  asthma improved.  speaking in full sentences sat 985 RA, rr 18, no wheezing audible. peak flow 300. psych cleared.  very pleasant and smiling.  tachycardia due to nebs  dx asthma exacerbation and anxiety.  rx prednisone, nebs.  f/u with pcp and outpt psych. left ambulatory.  return precautions given.

## 2018-06-12 NOTE — ED PROVIDER NOTE - OBJECTIVE STATEMENT
27 year old female , PTSD, anxiety , depression, not suicidal or homicidal c/o nightmares and anxiety attack,  on alprazolam, but doesn't like taking it. Also c/o asthma, has noted wheezing, no URI, likely seasonal allergies. . also had subconj hemorrhage, no change in vision, no eye pain.
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
## 2018-06-12 NOTE — ED PROVIDER NOTE - CARE PLAN
Principal Discharge DX:	Asthma exacerbation, mild  Secondary Diagnosis:	Anxiety disorder, unspecified type
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
## 2018-06-12 NOTE — ED BEHAVIORAL HEALTH ASSESSMENT NOTE - DESCRIPTION (FIRST USE, LAST USE, QUANTITY, FREQUENCY, DURATION)
1/2 ppd x over 10 years reports social use, denies abuse or dependence, denies bingeing daily use x 7 years reports last use at 23 yo, reports past abuse x 6 months at that time
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
## 2018-06-12 NOTE — ED BEHAVIORAL HEALTH ASSESSMENT NOTE - REFERRAL / APPOINTMENT DETAILS
patient provided outpatient referrals for [facility], [facility] and [facility] patient provided outpatient referrals for [facility], [facility] and [facility].  Also counseled for available substance abuse referrals, declined at this time.
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
## 2018-06-12 NOTE — ED BEHAVIORAL HEALTH ASSESSMENT NOTE - DESCRIPTION
Vital Signs Last 24 Hrs  T(C): 36.8 (12 Jun 2018 18:39), Max: 37.2 (12 Jun 2018 15:14)  T(F): 98.3 (12 Jun 2018 18:39), Max: 99 (12 Jun 2018 15:14)  HR: 113 (12 Jun 2018 18:39) (103 - 113)  BP: 138/90 (12 Jun 2018 18:39) (138/90 - 144/79)  BP(mean): --  RR: 18 (12 Jun 2018 18:39) (16 - 18)  SpO2: 100% (12 Jun 2018 18:39) (99% - 100%) Calm, cooperative, pleasant, no psychomotor abnormalities, no agitation, in good behavioral control, no prns required.    Vital Signs Last 24 Hrs  T(C): 36.8 (12 Jun 2018 18:39), Max: 37.2 (12 Jun 2018 15:14)  T(F): 98.3 (12 Jun 2018 18:39), Max: 99 (12 Jun 2018 15:14)  HR: 113 (12 Jun 2018 18:39) (103 - 113)  BP: 138/90 (12 Jun 2018 18:39) (138/90 - 144/79)  BP(mean): --  RR: 18 (12 Jun 2018 18:39) (16 - 18)  SpO2: 100% (12 Jun 2018 18:39) (99% - 100%) Asthma, Eczema Lives in a duplex with parents in [place], engaged, currently on suspension from job as an aide from [place]

## 2018-06-13 DIAGNOSIS — F14.11 COCAINE ABUSE, IN REMISSION: ICD-10-CM

## 2018-06-13 DIAGNOSIS — R69 ILLNESS, UNSPECIFIED: ICD-10-CM

## 2018-07-18 NOTE — ED ADULT NURSE NOTE - DISCHARGE DATE/TIME
Render Post-Care Instructions In Note?: no Consent: The patient's consent was obtained including but not limited to risks of crusting, scabbing, blistering, scarring, darker or lighter pigmentary change, recurrence, incomplete removal and infection. Detail Level: Simple Post-Care Instructions: I reviewed with the patient in detail post-care instructions. Patient is to wear sunprotection, and avoid picking at any of the treated lesions. Pt may apply Vaseline to crusted or scabbing areas. Duration Of Freeze Thaw-Cycle (Seconds): 0 Medical Necessity Information: It is in your best interest to select a reason for this procedure from the list below. All of these items fulfill various CMS LCD requirements except the new and changing color options. Medical Necessity Clause: This procedure was medically necessary because the lesions that were treated were: Number Of Freeze-Thaw Cycles: 2 freeze-thaw cycles 28-Aug-2017 06:42

## 2019-06-03 ENCOUNTER — EMERGENCY (EMERGENCY)
Facility: HOSPITAL | Age: 29
LOS: 1 days | Discharge: HOME | End: 2019-06-03
Attending: EMERGENCY MEDICINE | Admitting: EMERGENCY MEDICINE
Payer: COMMERCIAL

## 2019-06-03 VITALS
SYSTOLIC BLOOD PRESSURE: 136 MMHG | HEART RATE: 112 BPM | TEMPERATURE: 99 F | DIASTOLIC BLOOD PRESSURE: 68 MMHG | OXYGEN SATURATION: 98 % | RESPIRATION RATE: 18 BRPM

## 2019-06-03 VITALS
SYSTOLIC BLOOD PRESSURE: 127 MMHG | HEART RATE: 105 BPM | OXYGEN SATURATION: 96 % | RESPIRATION RATE: 18 BRPM | DIASTOLIC BLOOD PRESSURE: 87 MMHG | TEMPERATURE: 99 F

## 2019-06-03 PROBLEM — F17.210 NICOTINE DEPENDENCE, CIGARETTES, UNCOMPLICATED: Chronic | Status: ACTIVE | Noted: 2018-04-07

## 2019-06-03 PROBLEM — L30.9 DERMATITIS, UNSPECIFIED: Chronic | Status: ACTIVE | Noted: 2018-04-07

## 2019-06-03 PROBLEM — J45.909 UNSPECIFIED ASTHMA, UNCOMPLICATED: Chronic | Status: ACTIVE | Noted: 2018-04-06

## 2019-06-03 PROCEDURE — 99284 EMERGENCY DEPT VISIT MOD MDM: CPT | Mod: 25

## 2019-06-03 PROCEDURE — 71046 X-RAY EXAM CHEST 2 VIEWS: CPT | Mod: 26

## 2019-06-03 RX ORDER — MAGNESIUM SULFATE 500 MG/ML
1 VIAL (ML) INJECTION ONCE
Refills: 0 | Status: COMPLETED | OUTPATIENT
Start: 2019-06-03 | End: 2019-06-03

## 2019-06-03 RX ORDER — IPRATROPIUM/ALBUTEROL SULFATE 18-103MCG
3 AEROSOL WITH ADAPTER (GRAM) INHALATION ONCE
Refills: 0 | Status: COMPLETED | OUTPATIENT
Start: 2019-06-03 | End: 2019-06-03

## 2019-06-03 RX ORDER — ALBUTEROL 90 UG/1
2.5 AEROSOL, METERED ORAL ONCE
Refills: 0 | Status: COMPLETED | OUTPATIENT
Start: 2019-06-03 | End: 2019-06-03

## 2019-06-03 RX ADMIN — ALBUTEROL 2.5 MILLIGRAM(S): 90 AEROSOL, METERED ORAL at 02:49

## 2019-06-03 RX ADMIN — Medication 100 GRAM(S): at 01:23

## 2019-06-03 RX ADMIN — Medication 3 MILLILITER(S): at 01:28

## 2019-06-03 RX ADMIN — Medication 125 MILLIGRAM(S): at 01:24

## 2019-06-03 RX ADMIN — Medication 3 MILLILITER(S): at 01:20

## 2019-06-03 RX ADMIN — Medication 3 MILLILITER(S): at 01:24

## 2019-06-03 NOTE — ED PROVIDER NOTE - NSFOLLOWUPINSTRUCTIONS_ED_ALL_ED_FT
-- Please follow up with your doctor(s) within 2 days, or sooner if you are having any concerns or worsening symptoms.   -- We have sent a prescription for prednisone directly to your pharmacy.  Please pick it up as soon as possible and take as directed.  -- Use the albuterol inhaler, 2 puffs every 4 hours as needed for 2 days and then as needed after that.  Remember, technique is important: shake the inhaler, then pump the inhaler and take a several slow deep breaths through the spacer (aerochamber) with your mouth.  Always use the spacer (aerochamber) that we gave you in the Emergency Department whenever you use the inhaler.  -- Continue to take all of your other medications as previously prescribed.  -- You were given a copy of the results from any tests performed today in the Emergency Department which have results available.  Show these to your doctor(s).   Some of the tests we sent may not have results yet so please call or have your doctor call the Emergency Department to follow up on all results.  -- If you have any worsening wheezing, shortness of breath, chest pain, fever, chills or any other concerns please see you doctor right away or return to the Emergency Department. -- Please follow up with your doctor(s) within 2 days, or sooner if you are having any concerns or worsening symptoms.   -- Continue taking your prednisone.  -- Use the albuterol inhaler, 2 puffs every 4 hours as needed for 2 days and then as needed after that.  Remember, technique is important: shake the inhaler, then pump the inhaler and take a several slow deep breaths through the spacer (aerochamber) with your mouth.  Always use the spacer (aerochamber) that we gave you in the Emergency Department whenever you use the inhaler.  -- Continue to take all of your other medications as previously prescribed.  -- You were given a copy of the results from any tests performed today in the Emergency Department which have results available.  Show these to your doctor(s).   Some of the tests we sent may not have results yet so please call or have your doctor call the Emergency Department to follow up on all results.  -- If you have any worsening wheezing, shortness of breath, chest pain, fever, chills or any other concerns please see you doctor right away or return to the Emergency Department.

## 2019-06-03 NOTE — ED PROVIDER NOTE - ATTENDING CONTRIBUTION TO CARE
28F hx asthma (never PPV or hospitalized), PTSD, anxiety, depression p/w worsening SOB x4d in setting of URI.  SOB & cough persisted, not responsive to nebs, MDI & Brio.  No fevers x2d.  No OCPs, no prior hx DVT/PE, no recent trauma/surgery, no hemoptysis, leg swelling. No CP.    VS: afebrile, tachy to 105 (recent MDI)  Gen: Well appearing in NAD  Head: NC/AT  HEENT: moist mucous membranes   Neck: trachea midline  Resp:  No distress, coarse BS w/diffuse whz. speaking in full sentances.  nl WOB  Ext: no deformities  Neuro:  A&Ox3  Skin:  Warm and dry as visualized  Psych:  Normal affect and mood. no SI/HI, psych fx    MDM: Asthma Exacerbation in pt w/known hx of asthma (other DDx also can include: PNA, airway FB/mass, CHF, vocal cord dysfunction, bronchitis, GERD)  Plan: CXR, Peak flow monitoring, nebs. steroids, Mg if not responsive, BiPAP if severe, reassessment. 28F hx asthma (never PPV or hospitalized), PTSD, anxiety, depression p/w worsening SOB x4d in setting of URI.  SOB & cough persisted, not responsive to nebs, MDI & Brio.  No fevers x2d.  No OCPs, no prior hx DVT/PE, no recent trauma/surgery, no hemoptysis, leg swelling. No CP.    VS: afebrile, tachy to 105 (recent MDI)  Gen: Well appearing in NAD  Head: NC/AT  HEENT: moist mucous membranes   Neck: trachea midline  Resp:  No distress, coarse BS w/diffuse whz. speaking in full sentences.  nl WOB  Ext: no deformities  Neuro:  A&Ox3  Skin:  Warm and dry as visualized  Psych:  Normal affect and mood. no SI/HI, psych fx    MDM: Asthma Exacerbation in pt w/known hx of asthma (other DDx also can include: PNA, airway FB/mass, CHF, vocal cord dysfunction, bronchitis, GERD)  Plan: CXR, Peak flow monitoring, nebs. steroids, Mg if not responsive, BiPAP if severe, reassessment.

## 2019-06-03 NOTE — ED ADULT NURSE NOTE - OBJECTIVE STATEMENT
pt on bed aox3 c/o shortness of breath for 3 days worsening tonight, with productive cough yellow sputum subjective fever and chills. PMHx of Asthma on Albuterol w/o relief [facility] will [facility]

## 2019-06-03 NOTE — ED PROVIDER NOTE - PROGRESS NOTE DETAILS
Zvi Dubin, MD note: I cancelled the Mag as the pt is responding to our meds in the Emergency Department. Zvi Dubin, MD note: pt strill whz but feels less SOB after tx.  will continue to monitor.  Pt well educated as to her disease, understands return to the Emergency Department precautions & importance of meds/technique & MD f/u. Zvi Dubin, MD note: pt still whz but feels less SOB after tx.  will continue to monitor.  Pt well educated as to her disease, understands return to the Emergency Department precautions & importance of meds/technique & MD f/u. Zvi Dubin, MD note: Pt completed addl neb & feels vastly improved.  tachy 2/2 B2 agonists.  A&Ox3, pt tolerated PO & ambulated w/steady unassisted gait in the ED.  We discussed the results of ED w/u w/patient (incl. presumptive Emergency Department dx, associated anticipatory guidance, stressing importance of prompt f/u, return precautions).  Patient verbalized understanding of ED course & agreed with our f/u recommendations, has decisional making capacity.  Pt st they will f/u w/PMD within the next 3 days; pt agrees to call today or tomorrow for an appointment. Pt agrees to return to the ED if there is any worsening or concerning symptoms. Zvi Dubin, MD note: Pt completed addl neb & feels vastly improved.  tachy 2/2 B2 agonists.  A&Ox3, pt tolerated PO & ambulated w/steady unassisted gait in the ED.  SHe has prednisone at home.  also has a albuterol MDI w/ her & I gave her an aerochamber & instructed her on proper technique.  We discussed the results of ED w/u w/patient (incl. presumptive Emergency Department dx, associated anticipatory guidance, stressing importance of prompt f/u, return precautions).  Patient verbalized understanding of ED course & agreed with our f/u recommendations, has decisional making capacity.  Pt st they will f/u w/PMD within the next 3 days; pt agrees to call today or tomorrow for an appointment. Pt agrees to return to the ED if there is any worsening or concerning symptoms.

## 2019-06-03 NOTE — ED PROVIDER NOTE - OBJECTIVE STATEMENT
29yo F hx asthma (on alb pump and brio, 1 exacerbation per month) here with several days of worsening sob, that feels like her asthma. Initially had fever tmax 101 that resolved 2 days ago with rhinorrhea. Now coughing more. Started on prednisone 2 days ago. Used albuterol and nebulizer today to no relief. No recent travel, hx blood clots, not on OCPs.

## 2019-06-03 NOTE — ED ADULT TRIAGE NOTE - CHIEF COMPLAINT QUOTE
pt endorses sob x 2-3 days. reports fevers and cough. tmax 101.9. no relief from nebulizers or inhaler. tolerating room air and speaking in complete sentences

## 2019-06-03 NOTE — ED PROVIDER NOTE - PHYSICAL EXAMINATION
Gen: Well appearing, NAD, speaking full sentences  Head: NCAT  HEENT: PERRL, MMM, normal conjunctiva, anicteric, neck supple  Lung: CTAB, no adventitious sounds  CV: Coarse breath sounds w/ expiratory rhonchi b/l in mid and lower lung fields  Abd: soft, NTND, no rebound or guarding, no CVAT  MSK: No edema, no visible deformities  Neuro: Moving all extremities grossly  Skin: Warm and dry, no evidence of rash  Psych: normal mood and affect Gen: Well appearing, NAD, speaking full sentences  Head: NCAT  HEENT: PERRL, MMM, normal conjunctiva, anicteric, neck supple  Lung: CTAB, no adventitious sounds  CV: Coarse breath sounds w/ expiratory whz b/l in mid and lower lung fields  Abd: soft, NTND, no rebound or guarding, no CVAT  MSK: No edema, no visible deformities  Neuro: Moving all extremities grossly  Skin: Warm and dry, no evidence of rash  Psych: normal mood and affect

## 2019-06-03 NOTE — ED PROVIDER NOTE - NS ED ROS FT
CONSTITUTIONAL: No fevers, no chills, no lightheadedness, no dizziness  Eyes: no visual changes  Ears: no ear drainage, no ear pain  Nose: no nasal congestion  Mouth/Throat: no sore throat  CV: No chest pain, no palpitations  PULM: see hpi  GI: No n/v/d, no abd pain  : no dysuria, no hematuria  SKIN: no rashes.  NEURO: no headache, no focal weakness or numbness  PSYCHIATRIC: no known mental health issues.

## 2019-07-17 ENCOUNTER — OUTPATIENT (OUTPATIENT)
Dept: OUTPATIENT SERVICES | Facility: HOSPITAL | Age: 29
LOS: 1 days | Discharge: HOME | End: 2019-07-17

## 2019-08-14 DIAGNOSIS — F43.10 POST-TRAUMATIC STRESS DISORDER, UNSPECIFIED: ICD-10-CM

## 2019-11-13 NOTE — ED PROVIDER NOTE - CPE EDP RESP NORM
HISTORY OF PRESENT ILLNESS  Oly Marcum is a 72 y.o. female. HPI   f/u after  Last  visit for osteoporosis management  And  multinodular  goiter  From May 2019   To get Prolia shot today , but waiting to get it from her plan        Had usg and FNA on right 2 nodules in office in nov 2018         Old history :   Referred by pcp       Osteoporosis  Patient complains of osteoporosis. She was diagnosed with osteoporosis by bone density scan in nov 2017 . Patient denies history of fracture. The cause of osteoporosis is felt to be due to postmenopausal estrogen deficiency. She is currently being treated with calcium and vitamin D supplementation.   She is currently being treated with bisphosphonates   Osteoporosis Risk Factors   Nonmodifiable  Personal Hx of fracture as an adult: no  Hx of fracture in first-degree relative: no   race: no  Advanced age: yes  Female sex: yes  Dementia: no  Poor health/frailty: no     Potentially modifiable:  Tobacco use: no  Low body weight (<127 lbs): no  Estrogen deficiency     early menopause (age <45) or bilateral ovariectomy: no     prolonged premenopausal amenorrhea (>1 yr): no  Low calcium intake (lifelong): no  Alcoholism: no  Recurrent falls: no  Inadequate physical activity: no          Past Medical History:   Diagnosis Date    High cholesterol     Hypertension     Osteoporosis        Social History     Socioeconomic History    Marital status:      Spouse name: Not on file    Number of children: Not on file    Years of education: Not on file    Highest education level: Not on file   Occupational History    Not on file   Social Needs    Financial resource strain: Not on file    Food insecurity:     Worry: Not on file     Inability: Not on file    Transportation needs:     Medical: Not on file     Non-medical: Not on file   Tobacco Use    Smoking status: Light Tobacco Smoker    Smokeless tobacco: Never Used   Substance and Sexual Activity    Alcohol use: Yes    Drug use: No    Sexual activity: Not on file   Lifestyle    Physical activity:     Days per week: Not on file     Minutes per session: Not on file    Stress: Not on file   Relationships    Social connections:     Talks on phone: Not on file     Gets together: Not on file     Attends Rastafarian service: Not on file     Active member of club or organization: Not on file     Attends meetings of clubs or organizations: Not on file     Relationship status: Not on file    Intimate partner violence:     Fear of current or ex partner: Not on file     Emotionally abused: Not on file     Physically abused: Not on file     Forced sexual activity: Not on file   Other Topics Concern    Not on file   Social History Narrative    Not on file       History reviewed. No pertinent family history. Review of Systems   Constitutional: Negative. HENT: Negative. Eyes: Negative for pain and redness. Respiratory: Negative. Cardiovascular: Negative for chest pain, palpitations and leg swelling. Gastrointestinal: Negative. Negative for constipation. Genitourinary: Negative. Musculoskeletal: Positive for back pain, joint pain and myalgias. Skin: Negative. Neurological: Negative. Endo/Heme/Allergies: Negative. Psychiatric/Behavioral: Negative for depression and memory loss. The patient does not have insomnia. Physical Exam   Constitutional: She is oriented to person, place, and time. She appears well-developed and well-nourished. HENT:   Head: Normocephalic. Eyes: Conjunctivae and EOM are normal. Pupils are equal, round, and reactive to light. Neck: Normal range of motion. Neck supple. No JVD present. No tracheal deviation present. thyromegaly present. Cardiovascular: Normal rate, regular rhythm and normal heart sounds. No murmur heard. Pulmonary/Chest: Breath sounds normal.   Abdominal: Soft. Bowel sounds are normal.   Musculoskeletal: Normal range of motion. Lymphadenopathy:     She has no cervical adenopathy. Neurological: She is alert and oriented to person, place, and time. She has normal reflexes. Skin: Skin is warm. Psychiatric: She has a normal mood and affect. Reviewed labs   Has 24 hr urine  Calcium - over 400 mg     ASSESSMENT and PLAN    1. Osteoporosis :   Due for dexa  Nov 2019   Osteoporosis :  Date : nov 2017  Bone DEXA  ap spine T-score -2.9; left femoral Neck T- score  -2.4, right femoral neck T-score -2.5  Date : jan 19 2012  Bone DEXA  ap spine T-score -1.5 ( L2 is -2.5); left femoral Neck T- score  -2.2, right femoral neck T-score-2.3    AGE, tamoxifen AND HYPERCALCIURIA      She finished  primary screening for secondary causes of osteopOrosis (thyroid, parathyroid,  CAME POSITIVE FOR hypercalciuria, vitamin D def, multiple myeloma, celiac sprue). Discussed  starting on Prolia  twice yearly once insurance approval is done. Medication benefits, side-effects and adverse effects discussed in detail with patient and family. Patient is agreeable and started her on First shot today April 2018 ; second in oc 2018 ; And third in April 2019  ; She is due for   Fourth shot  today and is in transit     Fall precautions discussed with the patient   Patient will continue on high dose supplementation of vitamin D and calcium at least 1200mg a day      Continue with calcium (1000mg - 1200mg) and vit D supplements(800iu- 1200iu) daily. 2. H/o Breast cancer, left side  diagnosed in   2009   Had radiation treatments and lumpectomy   Tamoxifen ended 2014       3. HYPERCALCIURIA  : MORE THAN 400 IN 25 HOURS  In June 2018   STARTed ON HCTZ 25 MG A DAY  In June 2018   But decreased to HCTZ  To 12.5 mg a day    SHE  HAS LOW POTASSIUM DESPITE BEING ON LOW DOSE 12.5 MG A DAY     On  repeat 24 hr calcium after 2 months on 25 MG OF  hctz, great success with urine calcium to 214  From over 450       4.  Thyromegaly - usg  Showed presence of 2 thyroid nodules on right side,   FNA  :  negative for cancer from December 2018    5.   DM 2 - managed by pcp       > 50 % of time is spent on counseling   Patient voiced understanding her plan of care normal...

## 2022-05-09 ENCOUNTER — EMERGENCY (EMERGENCY)
Facility: HOSPITAL | Age: 32
LOS: 1 days | Discharge: LEFT BEFORE DISCHARGE | End: 2022-05-09
Attending: EMERGENCY MEDICINE | Admitting: EMERGENCY MEDICINE
Payer: MEDICAID

## 2022-05-09 VITALS
RESPIRATION RATE: 22 BRPM | DIASTOLIC BLOOD PRESSURE: 73 MMHG | OXYGEN SATURATION: 95 % | SYSTOLIC BLOOD PRESSURE: 125 MMHG | HEART RATE: 135 BPM | TEMPERATURE: 98 F

## 2022-05-09 LAB
ALBUMIN SERPL ELPH-MCNC: 4.1 G/DL — SIGNIFICANT CHANGE UP (ref 3.3–5)
ALP SERPL-CCNC: 73 U/L — SIGNIFICANT CHANGE UP (ref 40–120)
ALT FLD-CCNC: 20 U/L — SIGNIFICANT CHANGE UP (ref 4–33)
ANION GAP SERPL CALC-SCNC: 14 MMOL/L — SIGNIFICANT CHANGE UP (ref 7–14)
AST SERPL-CCNC: 16 U/L — SIGNIFICANT CHANGE UP (ref 4–32)
B PERT DNA SPEC QL NAA+PROBE: SIGNIFICANT CHANGE UP
B PERT+PARAPERT DNA PNL SPEC NAA+PROBE: SIGNIFICANT CHANGE UP
BASOPHILS # BLD AUTO: 0.01 K/UL — SIGNIFICANT CHANGE UP (ref 0–0.2)
BASOPHILS NFR BLD AUTO: 0.1 % — SIGNIFICANT CHANGE UP (ref 0–2)
BILIRUB SERPL-MCNC: <0.2 MG/DL — SIGNIFICANT CHANGE UP (ref 0.2–1.2)
BLOOD GAS VENOUS COMPREHENSIVE RESULT: SIGNIFICANT CHANGE UP
BORDETELLA PARAPERTUSSIS (RAPRVP): SIGNIFICANT CHANGE UP
BUN SERPL-MCNC: 5 MG/DL — LOW (ref 7–23)
C PNEUM DNA SPEC QL NAA+PROBE: SIGNIFICANT CHANGE UP
CALCIUM SERPL-MCNC: 9.5 MG/DL — SIGNIFICANT CHANGE UP (ref 8.4–10.5)
CHLORIDE SERPL-SCNC: 105 MMOL/L — SIGNIFICANT CHANGE UP (ref 98–107)
CO2 SERPL-SCNC: 18 MMOL/L — LOW (ref 22–31)
CREAT SERPL-MCNC: 0.51 MG/DL — SIGNIFICANT CHANGE UP (ref 0.5–1.3)
D DIMER BLD IA.RAPID-MCNC: 253 NG/ML DDU — HIGH
EGFR: 130 ML/MIN/1.73M2 — SIGNIFICANT CHANGE UP
EOSINOPHIL # BLD AUTO: 0 K/UL — SIGNIFICANT CHANGE UP (ref 0–0.5)
EOSINOPHIL NFR BLD AUTO: 0 % — SIGNIFICANT CHANGE UP (ref 0–6)
FLUAV SUBTYP SPEC NAA+PROBE: SIGNIFICANT CHANGE UP
FLUBV RNA SPEC QL NAA+PROBE: SIGNIFICANT CHANGE UP
GLUCOSE SERPL-MCNC: 253 MG/DL — HIGH (ref 70–99)
HADV DNA SPEC QL NAA+PROBE: SIGNIFICANT CHANGE UP
HCG SERPL-ACNC: <5 MIU/ML — SIGNIFICANT CHANGE UP
HCOV 229E RNA SPEC QL NAA+PROBE: SIGNIFICANT CHANGE UP
HCOV HKU1 RNA SPEC QL NAA+PROBE: SIGNIFICANT CHANGE UP
HCOV NL63 RNA SPEC QL NAA+PROBE: SIGNIFICANT CHANGE UP
HCOV OC43 RNA SPEC QL NAA+PROBE: SIGNIFICANT CHANGE UP
HCT VFR BLD CALC: 34 % — LOW (ref 34.5–45)
HGB BLD-MCNC: 11.2 G/DL — LOW (ref 11.5–15.5)
HMPV RNA SPEC QL NAA+PROBE: SIGNIFICANT CHANGE UP
HPIV1 RNA SPEC QL NAA+PROBE: SIGNIFICANT CHANGE UP
HPIV2 RNA SPEC QL NAA+PROBE: SIGNIFICANT CHANGE UP
HPIV3 RNA SPEC QL NAA+PROBE: SIGNIFICANT CHANGE UP
HPIV4 RNA SPEC QL NAA+PROBE: SIGNIFICANT CHANGE UP
IANC: 12.8 K/UL — HIGH (ref 1.8–7.4)
IMM GRANULOCYTES NFR BLD AUTO: 0.4 % — SIGNIFICANT CHANGE UP (ref 0–1.5)
LYMPHOCYTES # BLD AUTO: 0.52 K/UL — LOW (ref 1–3.3)
LYMPHOCYTES # BLD AUTO: 3.9 % — LOW (ref 13–44)
M PNEUMO DNA SPEC QL NAA+PROBE: SIGNIFICANT CHANGE UP
MCHC RBC-ENTMCNC: 30.7 PG — SIGNIFICANT CHANGE UP (ref 27–34)
MCHC RBC-ENTMCNC: 32.9 GM/DL — SIGNIFICANT CHANGE UP (ref 32–36)
MCV RBC AUTO: 93.2 FL — SIGNIFICANT CHANGE UP (ref 80–100)
MONOCYTES # BLD AUTO: 0.1 K/UL — SIGNIFICANT CHANGE UP (ref 0–0.9)
MONOCYTES NFR BLD AUTO: 0.7 % — LOW (ref 2–14)
NEUTROPHILS # BLD AUTO: 12.8 K/UL — HIGH (ref 1.8–7.4)
NEUTROPHILS NFR BLD AUTO: 94.9 % — HIGH (ref 43–77)
NRBC # BLD: 0 /100 WBCS — SIGNIFICANT CHANGE UP
NRBC # FLD: 0 K/UL — SIGNIFICANT CHANGE UP
NT-PROBNP SERPL-SCNC: 137 PG/ML — SIGNIFICANT CHANGE UP
PLATELET # BLD AUTO: 280 K/UL — SIGNIFICANT CHANGE UP (ref 150–400)
POTASSIUM SERPL-MCNC: 4.6 MMOL/L — SIGNIFICANT CHANGE UP (ref 3.5–5.3)
POTASSIUM SERPL-SCNC: 4.6 MMOL/L — SIGNIFICANT CHANGE UP (ref 3.5–5.3)
PROT SERPL-MCNC: 7.5 G/DL — SIGNIFICANT CHANGE UP (ref 6–8.3)
RAPID RVP RESULT: DETECTED
RBC # BLD: 3.65 M/UL — LOW (ref 3.8–5.2)
RBC # FLD: 13.2 % — SIGNIFICANT CHANGE UP (ref 10.3–14.5)
RSV RNA SPEC QL NAA+PROBE: SIGNIFICANT CHANGE UP
RV+EV RNA SPEC QL NAA+PROBE: DETECTED
SARS-COV-2 RNA SPEC QL NAA+PROBE: SIGNIFICANT CHANGE UP
SODIUM SERPL-SCNC: 137 MMOL/L — SIGNIFICANT CHANGE UP (ref 135–145)
TROPONIN T, HIGH SENSITIVITY RESULT: 7 NG/L — SIGNIFICANT CHANGE UP
WBC # BLD: 13.49 K/UL — HIGH (ref 3.8–10.5)
WBC # FLD AUTO: 13.49 K/UL — HIGH (ref 3.8–10.5)

## 2022-05-09 PROCEDURE — 99285 EMERGENCY DEPT VISIT HI MDM: CPT

## 2022-05-09 PROCEDURE — 71045 X-RAY EXAM CHEST 1 VIEW: CPT | Mod: 26

## 2022-05-09 RX ORDER — IPRATROPIUM/ALBUTEROL SULFATE 18-103MCG
3 AEROSOL WITH ADAPTER (GRAM) INHALATION
Refills: 0 | Status: COMPLETED | OUTPATIENT
Start: 2022-05-09 | End: 2022-05-09

## 2022-05-09 RX ORDER — SODIUM CHLORIDE 9 MG/ML
1000 INJECTION INTRAMUSCULAR; INTRAVENOUS; SUBCUTANEOUS ONCE
Refills: 0 | Status: COMPLETED | OUTPATIENT
Start: 2022-05-09 | End: 2022-05-09

## 2022-05-09 RX ORDER — MAGNESIUM SULFATE 500 MG/ML
2 VIAL (ML) INJECTION ONCE
Refills: 0 | Status: COMPLETED | OUTPATIENT
Start: 2022-05-09 | End: 2022-05-09

## 2022-05-09 RX ADMIN — Medication 3 MILLILITER(S): at 22:58

## 2022-05-09 RX ADMIN — Medication 3 MILLILITER(S): at 22:15

## 2022-05-09 RX ADMIN — Medication 125 MILLIGRAM(S): at 22:05

## 2022-05-09 RX ADMIN — SODIUM CHLORIDE 1000 MILLILITER(S): 9 INJECTION INTRAMUSCULAR; INTRAVENOUS; SUBCUTANEOUS at 22:58

## 2022-05-09 RX ADMIN — SODIUM CHLORIDE 1000 MILLILITER(S): 9 INJECTION INTRAMUSCULAR; INTRAVENOUS; SUBCUTANEOUS at 23:58

## 2022-05-09 RX ADMIN — Medication 3 MILLILITER(S): at 22:16

## 2022-05-09 RX ADMIN — Medication 150 GRAM(S): at 22:05

## 2022-05-09 NOTE — ED PROVIDER NOTE - ATTENDING CONTRIBUTION TO CARE
Ligia: I have seen and examined the patient face to face, have reviewed and addended the HPI, PE and a/p as necessary.       30 yo F with asthma, no hospitalizations, on OCPs a/w difficulty breathing x 3 days with associated sore throat and sinus pressure.  Pt reports being prescribed prednisone and cough suppressant.  Pt arrives today have acute shortness of breath, with associated L sided chest tightness.   No prior Hx of PE, no Trauma/Surgery in past 4 weeks, no Hemoptysis, no Unilateral Leg Swelling     GEN - NAD; well appearing; A+O x3; non-toxic appearing  CARD -s1s2, RRR, no M,G,R;   PULM - Poor air entry with diffuse expriatory wheeze. symmetric breath sounds;   ABD -  +BS, ND, NT, soft, no guarding, no rebound, no masses;   BACK - no CVA tenderness, Normal  spine;   EXT - symmetric pulses, 2+ dp, capillary refill < 2 seconds, no cyanosis, no edema;   NEURO - no focal neuro deficits, no slurred speech    Likely asthma exacerbation, though with L sided focal chest pain, will like give magnesium, duoneb x 3, chest xray to r/o pneumonia, follow up d-dimer and COVID swab.  If D-dimer + will require ct angio given on OCPs and tachycardic with chest tightness.

## 2022-05-09 NOTE — ED PROVIDER NOTE - OBJECTIVE STATEMENT
30y/o F w/ h/o asthma (no hospitalizations) p/w difficulty breathing for 3d, started out with sore throat and sinus pressure worse at night and today has been coughing more, some productive sputum. Today went to urgent care and was prescribed prednisone and cough suppressant albuterol with minor improvement but now having chest tightness on L side. On OCPs. No fevers, chills, nausea, vomiting, leg swelling, hemoptysis, abdominal pain, back pain, dysuria, numbness, tingling, sick contacts, travel history, recent surgeries, calf tenderness, cancer history.

## 2022-05-09 NOTE — ED PROVIDER NOTE - PHYSICAL EXAMINATION
Gen: NAD, non-toxic appearing  Head: normal appearing  HEENT: normal conjunctiva, oral mucosa moist  Lung: mild respiratory distress, speaking in full sentences, end expiratory wheezing    CV: regular rate and rhythm, no murmurs  Abd: soft, non distended, non tender   MSK: no visible deformities  Neuro: No focal deficits, AAOx3  Skin: Warm  Psych: normal affect

## 2022-05-09 NOTE — ED PROVIDER NOTE - CLINICAL SUMMARY MEDICAL DECISION MAKING FREE TEXT BOX
30y/o F w/ h/o asthma (no hospitalizations) p/w difficulty breathing for 3d a/w uri symptoms, now tachycardic, tachypneic, mild hypoxia likely asthma exacerbation. other ddx considered pe on ocp and tachy with sob and chest pain. plan ekg, labs, mag, steroids, duonebs and reassess.

## 2022-05-09 NOTE — ED PROVIDER NOTE - NSICDXFAMILYHX_GEN_ALL_CORE_FT
FAMILY HISTORY:  Father  Still living? Unknown  Family history of diabetes mellitus, Age at diagnosis: Age Unknown    Aunt  Still living? Unknown  Family history of breast cancer, Age at diagnosis: Age Unknown

## 2022-05-09 NOTE — ED PROVIDER NOTE - NS ED ROS FT
GENERAL: No fever, no chills  EYES: no change in vision  HEENT: no trouble swallowing, no trouble speaking  CARDIAC: +chest pain, no palpitations  PULMONARY: +cough, +SOB  GI: no abdominal pain, no nausea, no vomiting, no diarrhea, no constipation  : no dysuria, no frequency, no change in appearance, no odor of urine  SKIN: no rashes  NEURO: no headache, no weakness  MSK: no joint pain

## 2022-05-09 NOTE — ED ADULT NURSE NOTE - OBJECTIVE STATEMENT
received patient in room 8 c/o asthma exacerbation, SOB, weakness. patient is on cardiac monitor ST noted, O2 sat 100% on a room air. specimens collected sent to lab. IV fluid/nebulizer treatment x 3, medication given as ordered. fall precaution in place and safety maintained. family at the bedside.

## 2022-05-09 NOTE — ED ADULT TRIAGE NOTE - CHIEF COMPLAINT QUOTE
Patient c/o asthma exacerbation. Reports SOB/weakness/L sided chest pressure. Received steroid injection, prednisone, and nebulizer treatment at urgent care without relief. Respirations labored in triage. PMH asthma.

## 2022-05-10 VITALS
DIASTOLIC BLOOD PRESSURE: 91 MMHG | RESPIRATION RATE: 17 BRPM | SYSTOLIC BLOOD PRESSURE: 140 MMHG | OXYGEN SATURATION: 98 % | TEMPERATURE: 98 F | HEART RATE: 113 BPM

## 2022-05-10 PROCEDURE — 99236 HOSP IP/OBS SAME DATE HI 85: CPT

## 2022-05-10 PROCEDURE — 71275 CT ANGIOGRAPHY CHEST: CPT | Mod: 26,MA

## 2022-05-10 RX ORDER — IPRATROPIUM/ALBUTEROL SULFATE 18-103MCG
3 AEROSOL WITH ADAPTER (GRAM) INHALATION ONCE
Refills: 0 | Status: COMPLETED | OUTPATIENT
Start: 2022-05-10 | End: 2022-05-10

## 2022-05-10 RX ORDER — EPINEPHRINE 0.3 MG/.3ML
0.3 INJECTION INTRAMUSCULAR; SUBCUTANEOUS ONCE
Refills: 0 | Status: COMPLETED | OUTPATIENT
Start: 2022-05-10 | End: 2022-05-10

## 2022-05-10 RX ORDER — ALBUTEROL 90 UG/1
3 AEROSOL, METERED ORAL
Qty: 16 | Refills: 0
Start: 2022-05-10

## 2022-05-10 RX ORDER — SODIUM CHLORIDE 9 MG/ML
1000 INJECTION INTRAMUSCULAR; INTRAVENOUS; SUBCUTANEOUS
Refills: 0 | Status: DISCONTINUED | OUTPATIENT
Start: 2022-05-10 | End: 2022-05-13

## 2022-05-10 RX ORDER — BUDESONIDE AND FORMOTEROL FUMARATE DIHYDRATE 160; 4.5 UG/1; UG/1
2 AEROSOL RESPIRATORY (INHALATION)
Refills: 0 | Status: DISCONTINUED | OUTPATIENT
Start: 2022-05-10 | End: 2022-05-13

## 2022-05-10 RX ORDER — IPRATROPIUM/ALBUTEROL SULFATE 18-103MCG
3 AEROSOL WITH ADAPTER (GRAM) INHALATION EVERY 6 HOURS
Refills: 0 | Status: DISCONTINUED | OUTPATIENT
Start: 2022-05-10 | End: 2022-05-13

## 2022-05-10 RX ORDER — MONTELUKAST 4 MG/1
10 TABLET, CHEWABLE ORAL AT BEDTIME
Refills: 0 | Status: DISCONTINUED | OUTPATIENT
Start: 2022-05-10 | End: 2022-05-13

## 2022-05-10 RX ORDER — ATORVASTATIN CALCIUM 80 MG/1
40 TABLET, FILM COATED ORAL AT BEDTIME
Refills: 0 | Status: DISCONTINUED | OUTPATIENT
Start: 2022-05-10 | End: 2022-05-13

## 2022-05-10 RX ADMIN — SODIUM CHLORIDE 1000 MILLILITER(S): 9 INJECTION INTRAMUSCULAR; INTRAVENOUS; SUBCUTANEOUS at 03:00

## 2022-05-10 RX ADMIN — Medication 3 MILLILITER(S): at 19:22

## 2022-05-10 RX ADMIN — Medication 1200 MILLIGRAM(S): at 17:26

## 2022-05-10 RX ADMIN — SODIUM CHLORIDE 150 MILLILITER(S): 9 INJECTION INTRAMUSCULAR; INTRAVENOUS; SUBCUTANEOUS at 06:29

## 2022-05-10 RX ADMIN — SODIUM CHLORIDE 1000 MILLILITER(S): 9 INJECTION INTRAMUSCULAR; INTRAVENOUS; SUBCUTANEOUS at 02:00

## 2022-05-10 RX ADMIN — Medication 3 MILLILITER(S): at 15:35

## 2022-05-10 RX ADMIN — Medication 100 MILLIGRAM(S): at 15:35

## 2022-05-10 RX ADMIN — Medication 60 MILLIGRAM(S): at 13:58

## 2022-05-10 RX ADMIN — BUDESONIDE AND FORMOTEROL FUMARATE DIHYDRATE 2 PUFF(S): 160; 4.5 AEROSOL RESPIRATORY (INHALATION) at 19:50

## 2022-05-10 RX ADMIN — Medication 3 MILLILITER(S): at 04:01

## 2022-05-10 RX ADMIN — EPINEPHRINE 0.3 MILLIGRAM(S): 0.3 INJECTION INTRAMUSCULAR; SUBCUTANEOUS at 10:54

## 2022-05-10 RX ADMIN — Medication 60 MILLIGRAM(S): at 06:21

## 2022-05-10 RX ADMIN — Medication 3 MILLILITER(S): at 10:15

## 2022-05-10 NOTE — ED CDU PROVIDER INITIAL DAY NOTE - NS ED ROS FT
GENERAL: No fever, no chills  EYES: no change in vision  HEENT: no trouble swallowing, no trouble speaking  CARDIAC: +chest pain, no palpitations  PULMONARY: +cough, +SOB  GI: no abdominal pain, no nausea, no vomiting, no diarrhea, no constipation  : no dysuria, no frequency, no change in appearance, no odor of urine  SKIN: no rashes  NEURO: no headache, no weakness  MSK: no joint pain  - yuli downs

## 2022-05-10 NOTE — ED CDU PROVIDER DISPOSITION NOTE - NSFOLLOWUPINSTRUCTIONS_ED_ALL_ED_FT
Follow up with your PMD within 48-72 hours.      Rest, increase fluids.     Take Prednisone 50mg daily for 5 days,     Albuterol inhaler 2 inhalations every 4-6 hours as needed.     Use Symbicort 2 puffs twice a day.     Take all of your other medications as previously prescribed.     Any worsening wheezing, shortness of breath, chest pain, fever, chills return to the ED

## 2022-05-10 NOTE — ED CDU PROVIDER INITIAL DAY NOTE - OBJECTIVE STATEMENT
32y/o F w/ h/o asthma (no hospitalizations) p/w difficulty breathing for 3d, started out with sore throat and sinus pressure worse at night and today has been coughing more, some productive sputum. Today went to urgent care and was prescribed prednisone and cough suppressant albuterol with minor improvement but now having chest tightness on L side. On OCPs. No fevers, chills, nausea, vomiting, leg swelling, hemoptysis, abdominal pain, back pain, dysuria, numbness, tingling, sick contacts, travel history, recent surgeries, calf tenderness, cancer history.  IN ER given nebs, steroids, magnesiium, states that shes feeling a lot better, however still wheezing, will send to cdu for nebs, steroids.  pt had elevated dimer, will f/u on cta

## 2022-05-10 NOTE — ED CDU PROVIDER INITIAL DAY NOTE - PHYSICAL EXAMINATION
Gen: NAD, non-toxic appearing  Head: normal appearing  HEENT: normal conjunctiva, oral mucosa moist  Lung: mild respiratory distress, speaking in full sentences, end expiratory wheezing    CV: regular rate and rhythm, no murmurs  Abd: soft, non distended, non tender   MSK: no visible deformities  Neuro: No focal deficits, AAOx3  Skin: Warm  Psych: normal affect  - yuli grant

## 2022-05-10 NOTE — ED CDU PROVIDER DISPOSITION NOTE - ATTENDING CONTRIBUTION TO CARE
Ligia: I have seen and examined the patient face to face, have reviewed and addended the HPI, PE and a/p as necessary.    The pt is clinically sober, AA&Ox3, free from distracting injury.  Throughout our interactions in the ED today, the pt has demonstrated concrete thinking/reasoning, has maintained an orderly/reasonable conversation, appears to have intact insight/judgment/reason and therefore in our opinion has capacity to make decisions.  Given the pt’s presentation, we communicated our concern for persistent asthma exacerbation in laymans terms.    The pt verbalized an understanding of our worries. We’ve told the patient that the ED evaluation is incomplete & many troublesome conditions haven’t been r/o. We have discussed the need for further ED treatment, so her breathing could improved before leaving.  We have discussed the range of possible dx, potential testing & tx options.  We’ve made  numerous efforts to prevent the pt from leaving AMA.  Our discussions included the potential outcomes of leaving AMA, including worsening of their condition, becoming permanently disabled/in pain/critically ill, or death.  Despite these efforts, we were unable to convince the pt to stay.  The pt is refusing any  further care and is leaving against medical advice. We have attempted to offer tx/rx/guidance for any dangerous conditions which are most likely and/or dangerous.  We have answered all questions and have implored the pt to return ASAP to complete the w/u and for further treatment.     30 yo F with asthma (no hospitalizations) a/w difficulty breathing for 3d, started out with sore throat and sinus pressure worse at night and today has been coughing more, some productive sputum. Today went to urgent care and was prescribed prednisone and cough suppressant albuterol with minor improvement but now having chest tightness on L side. On OCPs. Noted in ED to have elevated dimer with CTA neg for PE.  Pt remained tachycardic and diffuse wheezing and placed in CDU for ongoing treatment.      Patient evaluated prior to AMA, noted to have persistent wheezing with tachycardia to the 120-130s worsened with exertion.  Despite persistent symptoms patient insists on discharge home to go to work in tomorrow afternoon.  I explained that we could watch her overnight and discharge her in the AM, so she could go to work at 2PM, however she insists on discharge tonight.  Return precautions provided.

## 2022-05-10 NOTE — ED CDU PROVIDER DISPOSITION NOTE - CLINICAL COURSE
32y/o F w/ h/o asthma (no hospitalizations) p/w difficulty breathing for 3d, started out with sore throat and sinus pressure worse at night and today has been coughing more, some productive sputum. Today went to urgent care and was prescribed prednisone and cough suppressant albuterol with minor improvement but now having chest tightness on L side. On OCPs.   IN ER given nebs, steroids, magnesium, states that shes feeling a lot better, however still wheezing send to cdu for nebs, steroids.  pt had elevated dimer, CTA neg for PE, Patient needs to go home, she is feeling improved, however still very wheezy and tachycardic. O2 sat stable. Explained to patient she needs  and should stay for further treatment as condition could get worse leading to syncope, intubation, possible death. patient understands and still would like to leave the hospital AMA.

## 2022-05-10 NOTE — ED CDU PROVIDER DISPOSITION NOTE - PATIENT PORTAL LINK FT
You can access the FollowMyHealth Patient Portal offered by [facility] by registering at the following website: [email or web address]. By joining [employer]’s FollowMyHealth portal, you will also be able to view your health information using other applications (apps) compatible with our system.

## 2022-05-10 NOTE — ED ADULT NURSE REASSESSMENT NOTE - NS ED NURSE REASSESS COMMENT FT1
Break coverage RN. Patient A&Ox4, resting in stretcher, respirations even and labored, RR25. SpO2 98% on room air. Although patient states improvement in condition after Duonebs. Continues tachycardiac on cardiac monitor. MD aware. Awaiting CTA. Vitals as noted. Stretcher in lowest position, wheels locked, appropriate side rails in place, call bell in reach.

## 2022-05-10 NOTE — ED CDU PROVIDER INITIAL DAY NOTE - MEDICAL DECISION MAKING DETAILS
30y/o F w/ h/o asthma (no hospitalizations) p/w difficulty breathing for 3d, started out with sore throat and sinus pressure worse at night and today has been coughing more, some productive sputum. Today went to urgent care and was prescribed prednisone and cough suppressant albuterol with minor improvement but now having chest tightness on L side. On OCPs. No fevers, chills, nausea, vomiting, leg swelling, hemoptysis, abdominal pain, back pain, dysuria, numbness, tingling, sick contacts, travel history, recent surgeries, calf tenderness, cancer history.  IN ER given nebs, steroids, magnesiium, states that shes feeling a lot better, however still wheezing, will send to cdu for nebs, steroids.  pt had elevated dimer, will f/u on cta

## 2022-06-12 ENCOUNTER — EMERGENCY (EMERGENCY)
Facility: HOSPITAL | Age: 32
LOS: 1 days | Discharge: HOME | End: 2022-06-12
Attending: EMERGENCY MEDICINE | Admitting: EMERGENCY MEDICINE
Payer: MEDICAID

## 2022-06-12 VITALS
TEMPERATURE: 98 F | RESPIRATION RATE: 18 BRPM | OXYGEN SATURATION: 98 % | SYSTOLIC BLOOD PRESSURE: 114 MMHG | HEART RATE: 99 BPM | DIASTOLIC BLOOD PRESSURE: 57 MMHG

## 2022-06-12 VITALS
RESPIRATION RATE: 17 BRPM | HEART RATE: 89 BPM | SYSTOLIC BLOOD PRESSURE: 114 MMHG | OXYGEN SATURATION: 100 % | DIASTOLIC BLOOD PRESSURE: 89 MMHG

## 2022-06-12 PROCEDURE — 93010 ELECTROCARDIOGRAM REPORT: CPT

## 2022-06-12 PROCEDURE — 99284 EMERGENCY DEPT VISIT MOD MDM: CPT | Mod: 25

## 2022-06-12 PROCEDURE — 71046 X-RAY EXAM CHEST 2 VIEWS: CPT | Mod: 26

## 2022-06-12 RX ORDER — ACETAMINOPHEN 500 MG
650 TABLET ORAL ONCE
Refills: 0 | Status: COMPLETED | OUTPATIENT
Start: 2022-06-12 | End: 2022-06-12

## 2022-06-12 RX ORDER — FLUCONAZOLE 150 MG/1
150 TABLET ORAL ONCE
Refills: 0 | Status: COMPLETED | OUTPATIENT
Start: 2022-06-12 | End: 2022-06-12

## 2022-06-12 RX ADMIN — FLUCONAZOLE 150 MILLIGRAM(S): 150 TABLET ORAL at 14:59

## 2022-06-12 RX ADMIN — Medication 650 MILLIGRAM(S): at 14:59

## 2022-06-12 NOTE — ED PROVIDER NOTE - NSFOLLOWUPINSTRUCTIONS_ED_ALL_ED_FT
You were evaluated in the Emergency Department for chest pain.       There are no signs of emergency conditions requiring admission to the hospital on today's workup.      We recommend that you see your primary care physician within the next 3 days for follow up.  Bring a copy of your discharge paperwork (including any test results) to your doctor.    Follow up with the pulmonologist within the next week for further evaluation.     ***Return to the emergency department if you have any other new/concerning symptoms, including but not limited to: worsening pain, shortness of breath, persistent fevers, fatigue*** You were evaluated in the Emergency Department for chest pain.       There are no signs of emergency conditions requiring admission to the hospital on today's workup.      We recommend that you see your primary care physician within the next 3 days for follow up.  Bring a copy of your discharge paperwork (including any test results) to your doctor.    Follow up with the pulmonologist within the next week for further evaluation.  You had "ground glass opacities" found on your recent chest CT that needs further evaluation.    ***Return to the emergency department if you have any other new/concerning symptoms, including but not limited to: worsening pain, shortness of breath, persistent fevers, fatigue***

## 2022-06-12 NOTE — ED PROVIDER NOTE - NSFOLLOWUPCLINICS_GEN_ALL_ED_FT
[facility] Pulmonolgy and Sleep Medicine  Pulmonology  [address] [place] [address], [place] [address]  Phone: [phone]  Fax:

## 2022-06-12 NOTE — ED ADULT NURSE NOTE - NSIMPLEMENTINTERV_GEN_ALL_ED
Implemented All Universal Safety Interventions:  [place] to call system. Call bell, personal items and telephone within reach. Instruct patient to call for assistance. Room bathroom lighting operational. Non-slip footwear when patient is off stretcher. Physically safe environment: no spills, clutter or unnecessary equipment. Stretcher in lowest position, wheels locked, appropriate side rails in place.

## 2022-06-12 NOTE — ED PROVIDER NOTE - PATIENT PORTAL LINK FT
You can access the FollowMyHealth Patient Portal offered by [facility] by registering at the following website: [email or web address]. By joining [employer]’s FollowMyHealth portal, you will also be able to view your health information using other applications (apps) compatible with our system.

## 2022-06-12 NOTE — ED PROVIDER NOTE - OBJECTIVE STATEMENT
30 yo F with hx of asthma presenting with diffuse chest pain x 2 weeks. Patient reports diffuse chest soreness worse with deep breath. States that she had a URI/asthma exacerbation 1 month ago, for which she was evaluated in ER with improvement. Had labs and CTA remarkable for ground glass opacities. Had persistent nonproductive cough which has since resolved. Started having worsening diffuse chest pain 2 weeks ago, went to urgent care, had an XR and instructed to come to the ER. Unsure of the findings of the XR, chose to not come because was feeling somewhat better. Patient took Motrin prior to arrival with some improvement of symptoms. Denies fevers/chills, cough, shortness of breath.

## 2022-06-12 NOTE — ED PROVIDER NOTE - CLINICAL SUMMARY MEDICAL DECISION MAKING FREE TEXT BOX
30 yo F with hx of asthma presenting with diffuse chest pain x 2 weeks. Nonexertional pleuritic pain following URI 1 month ago. CTA 5/10 showing ground glass opacities, rhinor/entero+ at that time. Suspect post viral pleurisy. Will repeat CXR for comparison and anticipate dc with pulm f/u.

## 2022-06-12 NOTE — ED ADULT TRIAGE NOTE - CHIEF COMPLAINT QUOTE
p/t c/o of cough, cold chest congestion for 2 weeks, sent by PMD for chest x rays, p/t denies any sob, c/o of occasional chest pain

## 2022-06-12 NOTE — ED ADULT NURSE NOTE - OBJECTIVE STATEMENT
Patient received to room 6. Patient is a&ox4 ambulatory. Denies any significant PMH. Patient stating since early may has had chest discomfort in the left area, came to [facility] has had a full workup and was discharged. Patient than went back to urgent care for more followup and shows same results and was told to come to the ed. Patient denies n/v, recent travel. Patient on exam is well appearing.

## 2022-06-12 NOTE — ED PROVIDER NOTE - NS ED ROS FT
Constitutional:  (-) fever, (-) chills, (-) fatigue  Eyes:  (-) eye pain (-) visual changes  ENMT: (-) nasal discharge, (-) sore throat. (-) neck pain or stiffness  Cardiac: (+) chest pain (-) palpitations  Respiratory:  (-) cough (-) shortness of breath  GI:  (-) nausea (-) vomiting (-) diarrhea (-) abdominal pain  :  (-) dysuria (-) frequency (-) burning  MS:  (-) back pain (-) joint pain  Neuro:  (-) headache (-) numbness (-) tingling (-) focal weakness  Skin:  (-) rash  Except as documented in the HPI,  all other systems are negative

## 2022-06-12 NOTE — ED PROVIDER NOTE - ATTENDING CONTRIBUTION TO CARE
Dr. Espinoza:  I have personally performed a face to face bedside history and physical examination of this patient. I have discussed the history, examination, review of systems, assessment and plan of management with the resident. I have reviewed the electronic medical record and amended it to reflect my history, review of systems, physical exam, assessment and plan.    31F h/o asthma presents c/o diffuse chest pain over past several weeks.  Had URI/asthma exacerbation a month ago, had CTA at that time showing ground glass opacities.  Pt states since then, her cough & congestion symptoms have improved, but persistent chest pain.  Pain improves with ibuprofen.  Denies fever/chills ,sob, n/v/d.    Exam;  - nad  - rrr  - ctab   -abd soft ntnd    A/P  - chest pain, likely component of pleurisy vs costochondritis  - CXR to r/o pneumonia  - ekg nsr  - pulmonology follow up for ground glass opacities

## 2022-06-14 DIAGNOSIS — Z01.812 ENCOUNTER FOR PREPROCEDURAL LABORATORY EXAMINATION: ICD-10-CM

## 2022-06-14 PROBLEM — Z00.00 ENCOUNTER FOR PREVENTIVE HEALTH EXAMINATION: Status: ACTIVE | Noted: 2022-06-14

## 2022-08-24 ENCOUNTER — APPOINTMENT (OUTPATIENT)
Dept: PULMONOLOGY | Facility: CLINIC | Age: 32
End: 2022-08-24

## 2022-08-24 ENCOUNTER — LABORATORY RESULT (OUTPATIENT)
Age: 32
End: 2022-08-24

## 2022-08-24 VITALS
OXYGEN SATURATION: 99 % | SYSTOLIC BLOOD PRESSURE: 132 MMHG | WEIGHT: 225 LBS | TEMPERATURE: 98.6 F | BODY MASS INDEX: 39.87 KG/M2 | HEIGHT: 63 IN | DIASTOLIC BLOOD PRESSURE: 98 MMHG | HEART RATE: 95 BPM

## 2022-08-24 DIAGNOSIS — Z82.5 FAMILY HISTORY OF ASTHMA AND OTHER CHRONIC LOWER RESPIRATORY DISEASES: ICD-10-CM

## 2022-08-24 DIAGNOSIS — J45.909 UNSPECIFIED ASTHMA, UNCOMPLICATED: ICD-10-CM

## 2022-08-24 DIAGNOSIS — R93.89 ABNORMAL FINDINGS ON DIAGNOSTIC IMAGING OF OTHER SPECIFIED BODY STRUCTURES: ICD-10-CM

## 2022-08-24 PROCEDURE — 94729 DIFFUSING CAPACITY: CPT

## 2022-08-24 PROCEDURE — 94010 BREATHING CAPACITY TEST: CPT

## 2022-08-24 PROCEDURE — 94726 PLETHYSMOGRAPHY LUNG VOLUMES: CPT

## 2022-08-24 PROCEDURE — 99204 OFFICE O/P NEW MOD 45 MIN: CPT | Mod: 25

## 2022-08-24 PROCEDURE — ZZZZZ: CPT

## 2022-08-24 NOTE — ASSESSMENT
[FreeTextEntry1] : 1. ABnormal Ct chest/ Chest pain: Possibly related to viral infection. ?Inhalation injury. Will repeat Ct chest.\par \par 2. Asthma: Patient has been noncompliant with symbicort 80. Will try smart therapy with symbicort 160. Discussed allergy avoidance.

## 2022-08-24 NOTE — HISTORY OF PRESENT ILLNESS
[Current] : current [TextBox_4] : Patient with lifelong asthma. Recent ER visit for pleuritic chest pain. CT at the time showed no pe but bilateral ggo and nasal swab positive for enterovirus. Chest pain persisted and patient went back to ER in june, advised pulmoary evaluation. \par \par For asthma, patient has symbicort but usually forgets to use. Has used albuterol about about 3-4 times. Has needed Prednisone 3-4 times over past year. Has 4 cats at home. \par \par Smokes about 3 cigarettes daily, occasional hookah with tobacco, vaped a few years ago.  [TextBox_11] : 0.2 [TextBox_13] : 10

## 2022-08-25 LAB
BASOPHILS # BLD AUTO: 0.03 K/UL
BASOPHILS NFR BLD AUTO: 0.4 %
EOSINOPHIL # BLD AUTO: 0.38 K/UL
EOSINOPHIL NFR BLD AUTO: 5.4 %
HCT VFR BLD CALC: 37.9 %
HGB BLD-MCNC: 12.3 G/DL
IMM GRANULOCYTES NFR BLD AUTO: 0.1 %
LYMPHOCYTES # BLD AUTO: 2.68 K/UL
LYMPHOCYTES NFR BLD AUTO: 38.1 %
MAN DIFF?: NORMAL
MCHC RBC-ENTMCNC: 30.8 PG
MCHC RBC-ENTMCNC: 32.5 GM/DL
MCV RBC AUTO: 95 FL
MONOCYTES # BLD AUTO: 0.31 K/UL
MONOCYTES NFR BLD AUTO: 4.4 %
NEUTROPHILS # BLD AUTO: 3.62 K/UL
NEUTROPHILS NFR BLD AUTO: 51.6 %
PLATELET # BLD AUTO: 287 K/UL
RBC # BLD: 3.99 M/UL
RBC # FLD: 13.1 %
WBC # FLD AUTO: 7.03 K/UL

## 2022-08-26 LAB
A ALTERNATA IGE QN: 1.35 KUA/L
A FUMIGATUS IGE QN: 1.11 KUA/L
C ALBICANS IGE QN: 11.3 KUA/L
C HERBARUM IGE QN: 0.2 KUA/L
CAT DANDER IGE QN: 69 KUA/L
COMMON RAGWEED IGE QN: 9.75 KUA/L
D FARINAE IGE QN: 27.5 KUA/L
D PTERONYSS IGE QN: 21.1 KUA/L
DEPRECATED A ALTERNATA IGE RAST QL: 2
DEPRECATED A FUMIGATUS IGE RAST QL: 2
DEPRECATED C ALBICANS IGE RAST QL: 3
DEPRECATED C HERBARUM IGE RAST QL: NORMAL
DEPRECATED CAT DANDER IGE RAST QL: 5
DEPRECATED COMMON RAGWEED IGE RAST QL: 3
DEPRECATED D FARINAE IGE RAST QL: 4
DEPRECATED D PTERONYSS IGE RAST QL: 4
DEPRECATED DOG DANDER IGE RAST QL: 3
DEPRECATED M RACEMOSUS IGE RAST QL: 2
DEPRECATED ROACH IGE RAST QL: 4
DEPRECATED TIMOTHY IGE RAST QL: 3
DEPRECATED WHITE OAK IGE RAST QL: 5
DOG DANDER IGE QN: 15.5 KUA/L
M RACEMOSUS IGE QN: 1.01 KUA/L
ROACH IGE QN: 36.3 KUA/L
TIMOTHY IGE QN: 15.4 KUA/L
TOTAL IGE SMQN RAST: 646 KU/L
WHITE OAK IGE QN: 81.9 KUA/L

## 2023-08-28 ENCOUNTER — RX RENEWAL (OUTPATIENT)
Age: 33
End: 2023-08-28

## 2023-08-28 RX ORDER — BUDESONIDE AND FORMOTEROL FUMARATE DIHYDRATE 160; 4.5 UG/1; UG/1
160-4.5 AEROSOL RESPIRATORY (INHALATION) TWICE DAILY
Qty: 1 | Refills: 11 | Status: ACTIVE | COMMUNITY
Start: 2022-08-24 | End: 1900-01-01

## 2023-09-26 NOTE — ED BEHAVIORAL HEALTH ASSESSMENT NOTE - NS ED BHA MED ROS CONSTITUTIONAL SYMPTOMS
Completed and faxed this morning.  
[employer] faxed over a questionnaire for Brian Epstein's review and completion.    This was put in Brian's mail folder.  
No complaints

## 2024-03-24 ENCOUNTER — EMERGENCY (EMERGENCY)
Facility: HOSPITAL | Age: 34
LOS: 1 days | Discharge: HOME | End: 2024-03-24
Admitting: EMERGENCY MEDICINE
Payer: COMMERCIAL

## 2024-03-24 VITALS
DIASTOLIC BLOOD PRESSURE: 97 MMHG | RESPIRATION RATE: 18 BRPM | TEMPERATURE: 98 F | SYSTOLIC BLOOD PRESSURE: 140 MMHG | HEART RATE: 77 BPM | OXYGEN SATURATION: 100 %

## 2024-03-24 LAB — HIV 1+2 AB+HIV1 P24 AG SERPL QL IA: SIGNIFICANT CHANGE UP

## 2024-03-24 PROCEDURE — 99284 EMERGENCY DEPT VISIT MOD MDM: CPT

## 2024-03-24 RX ORDER — CEFTRIAXONE 500 MG/1
500 INJECTION, POWDER, FOR SOLUTION INTRAMUSCULAR; INTRAVENOUS ONCE
Refills: 0 | Status: COMPLETED | OUTPATIENT
Start: 2024-03-24 | End: 2024-03-24

## 2024-03-24 RX ADMIN — CEFTRIAXONE 500 MILLIGRAM(S): 500 INJECTION, POWDER, FOR SOLUTION INTRAMUSCULAR; INTRAVENOUS at 21:12

## 2024-03-24 RX ADMIN — Medication 100 MILLIGRAM(S): at 21:12

## 2024-03-24 NOTE — ED PROVIDER NOTE - OBJECTIVE STATEMENT
32 yo F with PMH of asthma, eczema, trichomoniasis s/p treatment in Jan, presents to ED c/o STI testing a/w vaginal discharge and itchiness. 34 yo F with PMH of asthma, eczema, trichomoniasis s/p treatment in Jan, presents to ED c/o STI testing a/w vaginal discharge and itchiness x2 days. Reports thin white discharge, no smell to it w/ some itchiness. Admits she was treated for trich in January. Reports sexually active, no protection with her ex, who also might have other partners. Admits she had a couple of other partners w/ protection. Denies any fever, chills, n/v/d, back pain, pelvic pain, abdominal pan, vaginal bleeding, or any other complaints.

## 2024-03-24 NOTE — ED PROVIDER NOTE - CARE PLAN
1 Principal Discharge DX:	Vaginal discharge  Secondary Diagnosis:	Screen for sexually transmitted diseases

## 2024-03-24 NOTE — ED ADULT NURSE NOTE - OBJECTIVE STATEMENT
Patient received in ED intake room 4. A&Ox4 and ambulatory. C/o vaginal discomfort associated with white vaginal discharge x 3 days. Pt states had unprotected sexual intercourse recently with a partner who has previous given patient an STI. Pt requesting STI testing. Denies CP, SOB, nausea, vomiting, headache, lightheadedness, dizziness, fever or chills. Well appearing sitting up in stretcher HOB elevated. Airway patent, speaking in full and complete sentences. No acute distress noted. Labs drawn with butterfly and sent as ordered. Bed in lowest position, call bell in reach, wheels locked, safety maintained. Awaiting further orders.

## 2024-03-24 NOTE — ED PROVIDER NOTE - PATIENT PORTAL LINK FT
You can access the FollowMyHealth Patient Portal offered by [facility] by registering at the following website: [email or web address]. By joining [employer]’s FollowMyHealth portal, you will also be able to view your health information using other applications (apps) compatible with our system.

## 2024-03-24 NOTE — ED PROVIDER NOTE - CLINICAL SUMMARY MEDICAL DECISION MAKING FREE TEXT BOX
34 yo F with PMH of asthma, eczema, trichomoniasis s/p treatment in Jan, presents to ED c/o STI testing a/w vaginal discharge and itchiness. well appearing female. afebrile. no pelvic/abd pain. Screen for STD, pt agrees to prophylaxis treatment for gonorrhea and chlamydia. Plan: STD testing, Ceftriaxone, doxyclycline for prophylaxis treatment, and discharge w/ PCP/OBGYN.

## 2024-03-24 NOTE — ED ADULT TRIAGE NOTE - CHIEF COMPLAINT QUOTE
Pt. requesting STI testing. c/o vaginal discharge white in color. Denies odor or itching. States she was diagnosed with trichomonas in jan.

## 2024-03-25 ENCOUNTER — TRANSCRIPTION ENCOUNTER (OUTPATIENT)
Age: 34
End: 2024-03-25

## 2024-03-25 LAB
HAV IGM SER-ACNC: SIGNIFICANT CHANGE UP
HBV CORE IGM SER-ACNC: SIGNIFICANT CHANGE UP
HBV SURFACE AG SER-ACNC: SIGNIFICANT CHANGE UP
HCV AB S/CO SERPL IA: 0.09 S/CO — SIGNIFICANT CHANGE UP (ref 0–0.99)
HCV AB SERPL-IMP: SIGNIFICANT CHANGE UP

## 2024-03-26 LAB
C TRACH RRNA SPEC QL NAA+PROBE: SIGNIFICANT CHANGE UP
N GONORRHOEA RRNA SPEC QL NAA+PROBE: SIGNIFICANT CHANGE UP
SPECIMEN SOURCE: SIGNIFICANT CHANGE UP
SPECIMEN SOURCE: SIGNIFICANT CHANGE UP
T PALLIDUM AB TITR SER: NEGATIVE — SIGNIFICANT CHANGE UP
T VAGINALIS RRNA SPEC QL NAA+PROBE: SIGNIFICANT CHANGE UP

## 2024-09-27 ENCOUNTER — EMERGENCY (EMERGENCY)
Facility: HOSPITAL | Age: 34
LOS: 1 days | Discharge: HOME | End: 2024-09-27
Attending: STUDENT IN AN ORGANIZED HEALTH CARE EDUCATION/TRAINING PROGRAM | Admitting: STUDENT IN AN ORGANIZED HEALTH CARE EDUCATION/TRAINING PROGRAM
Payer: COMMERCIAL

## 2024-09-27 VITALS
SYSTOLIC BLOOD PRESSURE: 138 MMHG | RESPIRATION RATE: 16 BRPM | DIASTOLIC BLOOD PRESSURE: 94 MMHG | HEART RATE: 103 BPM | TEMPERATURE: 98 F | OXYGEN SATURATION: 100 % | WEIGHT: 179.9 LBS | HEIGHT: 62 IN

## 2024-09-27 VITALS
DIASTOLIC BLOOD PRESSURE: 97 MMHG | HEART RATE: 73 BPM | OXYGEN SATURATION: 100 % | TEMPERATURE: 99 F | SYSTOLIC BLOOD PRESSURE: 139 MMHG | RESPIRATION RATE: 16 BRPM

## 2024-09-27 LAB
APPEARANCE UR: CLEAR — SIGNIFICANT CHANGE UP
BACTERIA # UR AUTO: ABNORMAL /HPF
BILIRUB UR-MCNC: NEGATIVE — SIGNIFICANT CHANGE UP
CAST: 0 /LPF — SIGNIFICANT CHANGE UP (ref 0–4)
COLOR SPEC: YELLOW — SIGNIFICANT CHANGE UP
DIFF PNL FLD: NEGATIVE — SIGNIFICANT CHANGE UP
GLUCOSE UR QL: NEGATIVE MG/DL — SIGNIFICANT CHANGE UP
HCV AB S/CO SERPL IA: 0.1 S/CO — SIGNIFICANT CHANGE UP (ref 0–0.99)
HCV AB SERPL-IMP: SIGNIFICANT CHANGE UP
HIV 1+2 AB+HIV1 P24 AG SERPL QL IA: SIGNIFICANT CHANGE UP
KETONES UR-MCNC: ABNORMAL MG/DL
LEUKOCYTE ESTERASE UR-ACNC: NEGATIVE — SIGNIFICANT CHANGE UP
NITRITE UR-MCNC: NEGATIVE — SIGNIFICANT CHANGE UP
PH UR: 6.5 — SIGNIFICANT CHANGE UP (ref 5–8)
PROT UR-MCNC: 300 MG/DL
RBC CASTS # UR COMP ASSIST: 1 /HPF — SIGNIFICANT CHANGE UP (ref 0–4)
SP GR SPEC: 1.02 — SIGNIFICANT CHANGE UP (ref 1–1.03)
SQUAMOUS # UR AUTO: 23 /HPF — HIGH (ref 0–5)
T PALLIDUM AB TITR SER: NEGATIVE — SIGNIFICANT CHANGE UP
UROBILINOGEN FLD QL: 1 MG/DL — SIGNIFICANT CHANGE UP (ref 0.2–1)
WBC UR QL: 1 /HPF — SIGNIFICANT CHANGE UP (ref 0–5)

## 2024-09-27 PROCEDURE — 99284 EMERGENCY DEPT VISIT MOD MDM: CPT

## 2024-09-27 PROCEDURE — 76830 TRANSVAGINAL US NON-OB: CPT | Mod: 26

## 2024-09-27 NOTE — ED PROVIDER NOTE - OBJECTIVE STATEMENT
34-year-old  female with history of trichomonas, recurrent BV, fibroids presenting with left lower quadrant and back pain and white thin vaginal discharge.  She endorses oral and vaginal suppository treatment for BV in the last 2 months with continued vaginal discharge.  LMP 2 weeks ago.  She reports male and female sexual partners in the past but endorses having protected sex with 1 partner at this time. She denies fevers, abnormal vaginal bleeding, dysuria, hematuria.

## 2024-09-27 NOTE — ED PROVIDER NOTE - NSFOLLOWUPINSTRUCTIONS_ED_ALL_ED_FT
Ultrasound revealed 2 fibroids, no ovarian cyst or tubo-ovarian abscesses.  Urine negative for UTI.  Pending HIV results.  All other labs will come back tomorrow and patient will be notified.  You were evaluated in the emergency department today for vaginal discharge and left lower quadrant pain. We performed a pelvic exam, with no significant findings. We obtained an ultrasound which revealed fibroids but no concern for cysts or abscesses. We tested for syphilis, chlamydia, gonorrhea, BV, trichomonas, hepatitis C, and HIV. HIV was ----, You will receive a call about the rest of your results Monday, they will also be available on your patient portal. Please abstain from sex until results. You were evaluated in the emergency department today for vaginal discharge and left lower quadrant pain. We performed a pelvic exam, with no significant findings. We obtained an ultrasound which revealed fibroids but no concern for cysts or abscesses. We tested for syphilis, chlamydia, gonorrhea, BV, trichomonas, hepatitis C, and HIV. HIV was negative. You will receive a call about the rest of your results Monday, they will also be available on your patient portal. Please abstain from sex until results.  If your pain is not resolved with over the counter medication or you notice abnormal vaginal bleeding or excess discharge please return to the ED or your PCP for repeat testing. Please follow with your OBGYN.

## 2024-09-27 NOTE — ED PROVIDER NOTE - PATIENT PORTAL LINK FT
You can access the FollowMyHealth Patient Portal offered by [facility] by registering at the following website: [email or web address]. By joining [employer]’s FollowMyHealth portal, you will also be able to view your health information using other applications (apps) compatible with our system.

## 2024-09-27 NOTE — ED PROVIDER NOTE - PHYSICAL EXAMINATION
GENERAL: Awake, alert, NAD  HEENT: NC/AT, moist mucous membranes  ABDOMEN: Soft, mild LLQ tenderness   BACK: No midline spinal tenderness, no CVA tenderness  : Closed cervix, no erythema or discharge from cervix, no CMT - exam chaperoned by Moustapha Messer   NEURO: A&Ox3. Moving all extremities.  SKIN: Warm and dry. No rash.  PSYCH: Normal affect.

## 2024-09-27 NOTE — ED ADULT TRIAGE NOTE - TEMPERATURE IN CELSIUS (DEGREES C)
count  of 194,000. The patient's last hemoglobin was 7 gm percent on  04/01/2020. The patient was transfused with 1 unit of packed RBC. Repeat hemoglobin is 7.8 gm percent. The patient is on IV Protonix. The patient's troponin levels are also elevated, and the patient had  been seen by the Cardiology consultant as well to rule out MI. The  patient is hemodynamically stable at present. The patient was seen by  Dr. Mary Grace Loyola for chronic anemia on 11/15/2019 with a hemoglobin of 6.6  gm percent. He was transfused with packed RBC. REVIEW OF SYSTEMS:  CENTRAL NERVOUS SYSTEM:  The patient complains of dizziness, but denies  focal sensorimotor symptoms. CARDIOVASCULAR SYSTEM:  No history of chest pain, but the patient  complains of shortness of breath, but no leg swelling. GENITOURINARY SYSTEM:  No history of dysuria, pyuria, or hematuria. MUSCULOSKELETAL SYSTEM:  The patient complains of generalized weakness. RESPIRATORY SYSTEM:  No history of cough, hemoptysis, fever, or chills. 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 FAMILY HISTORY:  Noncontributory. MEDICATIONS:  Please refer to the chart. The patient was not taking  NSAIDs prior to admission. SOCIOECONOMIC HISTORY:  The patient does drink alcohol and is a former  smoker. There is no history of a recreational drug use. 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 ALLERGIES:  No known drug allergies.     PHYSICAL 36.6

## 2024-09-27 NOTE — ED PROVIDER NOTE - CLINICAL SUMMARY MEDICAL DECISION MAKING FREE TEXT BOX
34 yr old female hx of bv, trichomonas, fibroids 2 months of white thin vaginal discharge with LLQ pain. No CMT on exam, no erythema or discharge from cervix. Will obtain STI screening, Bv and Trich and TVUS to eval for pelvic-ovarian abscess.

## 2024-09-27 NOTE — ED PROVIDER NOTE - PROGRESS NOTE DETAILS
Kathryn Milan DO (PGY-1): Ultrasound revealed 2 fibroids, no ovarian cyst or tubo-ovarian abscesses.  Urine negative for UTI.  Pending HIV results.  All other labs will come back tomorrow and patient will be notified.

## 2024-09-27 NOTE — ED ADULT NURSE NOTE - OBJECTIVE STATEMENT
35 y/o F presents to ED intake A&Ox4 c/o vaginal itching, lower abd pain, and lower back pain. denies fevers, weakness, chills, HA, vaginal bleeding. no acute distress noted.  Hx bacterial vaginosis, fibroids. 20G to LAC, labs drawn and sent. awaiting results

## 2024-09-27 NOTE — ED ADULT TRIAGE NOTE - CHIEF COMPLAINT QUOTE
Pt c/o lower back pain and lower abdominal pain. Denies urinary symptoms. Hx bacterial vaginosis, fibroids

## 2024-09-27 NOTE — ED ADULT NURSE REASSESSMENT NOTE - NS ED NURSE REASSESS COMMENT FT1
INTAKE RN: Report receieved from night intake RN. Patient remains at baseline mental status. Appears to be resting comfortably in stretcher, breathing even and unlabored on room air. NAD noted at this time. Pt offers no new complaints at this time. Vital signs as charted. Pending US results. Safety maintained, stretcher locked in lowest position, personal items within reach.

## 2024-09-27 NOTE — ED PROVIDER NOTE - ATTENDING CONTRIBUTION TO CARE
34-year-old G5, P0 presents emergency department with left lower quadrant pain and back pain.  Abdomen was soft nontender nondistended.  No evidence of cervicitis or CMT.  Patient is otherwise well-appearing.  Patient consented to screening for STIs.  Follow-up PCP/private gynecologist.

## 2024-11-19 ENCOUNTER — NON-APPOINTMENT (OUTPATIENT)
Age: 34
End: 2024-11-19

## 2024-11-19 NOTE — ED ADULT TRIAGE NOTE - TEMPERATURE IN CELSIUS (DEGREES C)
[de-identified] : Left knee patient presents as a fair amount of discomfort to the medial joint line range of motion 0 to 125 degrees there is no locking.  Negative Alton's sign soft tissue and warmth is noted but no obvious effusion.  The knee is stable extra stress valgus present with full extension and 90 degrees of flexion.  Patient has some consistent grade 1 swelling from the mid tibia down to the foot.  He is neurovascular intact distally no appreciable Baker's cyst palpated today. 36.4

## 2024-11-20 ENCOUNTER — NON-APPOINTMENT (OUTPATIENT)
Age: 34
End: 2024-11-20

## 2025-04-15 ENCOUNTER — ASOB RESULT (OUTPATIENT)
Age: 35
End: 2025-04-15

## 2025-04-15 ENCOUNTER — APPOINTMENT (OUTPATIENT)
Dept: MATERNAL FETAL MEDICINE | Facility: CLINIC | Age: 35
End: 2025-04-15
Payer: COMMERCIAL

## 2025-04-15 ENCOUNTER — APPOINTMENT (OUTPATIENT)
Dept: ANTEPARTUM | Facility: CLINIC | Age: 35
End: 2025-04-15
Payer: COMMERCIAL

## 2025-04-15 PROCEDURE — 76805 OB US >/= 14 WKS SNGL FETUS: CPT

## 2025-04-15 PROCEDURE — 99204 OFFICE O/P NEW MOD 45 MIN: CPT

## 2025-04-22 ENCOUNTER — APPOINTMENT (OUTPATIENT)
Dept: PEDIATRIC CARDIOLOGY | Facility: CLINIC | Age: 35
End: 2025-04-22
Payer: COMMERCIAL

## 2025-04-22 PROCEDURE — 76820 UMBILICAL ARTERY ECHO: CPT

## 2025-04-22 PROCEDURE — 76825 ECHO EXAM OF FETAL HEART: CPT

## 2025-04-22 PROCEDURE — 76827 ECHO EXAM OF FETAL HEART: CPT

## 2025-04-22 PROCEDURE — 76821 MIDDLE CEREBRAL ARTERY ECHO: CPT

## 2025-04-22 PROCEDURE — 93325 DOPPLER ECHO COLOR FLOW MAPG: CPT | Mod: 59

## 2025-04-22 PROCEDURE — 99203 OFFICE O/P NEW LOW 30 MIN: CPT | Mod: 25

## 2025-04-29 ENCOUNTER — APPOINTMENT (OUTPATIENT)
Dept: ANTEPARTUM | Facility: CLINIC | Age: 35
End: 2025-04-29
Payer: COMMERCIAL

## 2025-04-29 ENCOUNTER — NON-APPOINTMENT (OUTPATIENT)
Age: 35
End: 2025-04-29

## 2025-04-29 ENCOUNTER — ASOB RESULT (OUTPATIENT)
Age: 35
End: 2025-04-29

## 2025-04-29 VITALS — HEART RATE: 85 BPM | DIASTOLIC BLOOD PRESSURE: 64 MMHG | SYSTOLIC BLOOD PRESSURE: 96 MMHG

## 2025-04-29 PROCEDURE — 76811 OB US DETAILED SNGL FETUS: CPT

## 2025-05-05 ENCOUNTER — ASOB RESULT (OUTPATIENT)
Age: 35
End: 2025-05-05

## 2025-05-05 ENCOUNTER — APPOINTMENT (OUTPATIENT)
Dept: ANTEPARTUM | Facility: CLINIC | Age: 35
End: 2025-05-05
Payer: COMMERCIAL

## 2025-05-05 ENCOUNTER — EMERGENCY (EMERGENCY)
Facility: HOSPITAL | Age: 35
LOS: 1 days | End: 2025-05-05
Admitting: EMERGENCY MEDICINE
Payer: COMMERCIAL

## 2025-05-05 ENCOUNTER — OUTPATIENT (OUTPATIENT)
Dept: INPATIENT UNIT | Facility: HOSPITAL | Age: 35
LOS: 1 days | End: 2025-05-05
Payer: COMMERCIAL

## 2025-05-05 VITALS
RESPIRATION RATE: 17 BRPM | WEIGHT: 205.03 LBS | OXYGEN SATURATION: 100 % | SYSTOLIC BLOOD PRESSURE: 127 MMHG | TEMPERATURE: 98 F | HEART RATE: 105 BPM | DIASTOLIC BLOOD PRESSURE: 77 MMHG

## 2025-05-05 VITALS
DIASTOLIC BLOOD PRESSURE: 67 MMHG | HEART RATE: 90 BPM | SYSTOLIC BLOOD PRESSURE: 120 MMHG | RESPIRATION RATE: 15 BRPM | TEMPERATURE: 98 F

## 2025-05-05 VITALS
RESPIRATION RATE: 16 BRPM | DIASTOLIC BLOOD PRESSURE: 68 MMHG | TEMPERATURE: 98 F | OXYGEN SATURATION: 99 % | SYSTOLIC BLOOD PRESSURE: 119 MMHG | HEART RATE: 95 BPM

## 2025-05-05 DIAGNOSIS — O26.899 OTHER SPECIFIED PREGNANCY RELATED CONDITIONS, UNSPECIFIED TRIMESTER: ICD-10-CM

## 2025-05-05 LAB
ALBUMIN SERPL ELPH-MCNC: 3.4 G/DL — SIGNIFICANT CHANGE UP (ref 3.3–5)
ALP SERPL-CCNC: 54 U/L — SIGNIFICANT CHANGE UP (ref 40–120)
ALT FLD-CCNC: 10 U/L — SIGNIFICANT CHANGE UP (ref 4–33)
AMYLASE P1 CFR SERPL: 79 U/L — SIGNIFICANT CHANGE UP (ref 25–125)
ANION GAP SERPL CALC-SCNC: 12 MMOL/L — SIGNIFICANT CHANGE UP (ref 7–14)
APPEARANCE UR: CLEAR — SIGNIFICANT CHANGE UP
AST SERPL-CCNC: 15 U/L — SIGNIFICANT CHANGE UP (ref 4–32)
BASOPHILS # BLD AUTO: 0.03 K/UL — SIGNIFICANT CHANGE UP (ref 0–0.2)
BASOPHILS NFR BLD AUTO: 0.3 % — SIGNIFICANT CHANGE UP (ref 0–2)
BILIRUB SERPL-MCNC: 0.3 MG/DL — SIGNIFICANT CHANGE UP (ref 0.2–1.2)
BILIRUB UR-MCNC: NEGATIVE — SIGNIFICANT CHANGE UP
BUN SERPL-MCNC: 6 MG/DL — LOW (ref 7–23)
CALCIUM SERPL-MCNC: 8.8 MG/DL — SIGNIFICANT CHANGE UP (ref 8.4–10.5)
CHLORIDE SERPL-SCNC: 102 MMOL/L — SIGNIFICANT CHANGE UP (ref 98–107)
CO2 SERPL-SCNC: 20 MMOL/L — LOW (ref 22–31)
COLOR SPEC: YELLOW — SIGNIFICANT CHANGE UP
CREAT SERPL-MCNC: 0.48 MG/DL — LOW (ref 0.5–1.3)
DIFF PNL FLD: NEGATIVE — SIGNIFICANT CHANGE UP
EGFR: 129 ML/MIN/1.73M2 — SIGNIFICANT CHANGE UP
EGFR: 129 ML/MIN/1.73M2 — SIGNIFICANT CHANGE UP
EOSINOPHIL # BLD AUTO: 0.31 K/UL — SIGNIFICANT CHANGE UP (ref 0–0.5)
EOSINOPHIL NFR BLD AUTO: 3.3 % — SIGNIFICANT CHANGE UP (ref 0–6)
GLUCOSE SERPL-MCNC: 74 MG/DL — SIGNIFICANT CHANGE UP (ref 70–99)
GLUCOSE UR QL: NEGATIVE MG/DL — SIGNIFICANT CHANGE UP
HCT VFR BLD CALC: 32.4 % — LOW (ref 34.5–45)
HGB BLD-MCNC: 10.8 G/DL — LOW (ref 11.5–15.5)
IANC: 5.75 K/UL — SIGNIFICANT CHANGE UP (ref 1.8–7.4)
IMM GRANULOCYTES NFR BLD AUTO: 0.4 % — SIGNIFICANT CHANGE UP (ref 0–0.9)
KETONES UR-MCNC: NEGATIVE MG/DL — SIGNIFICANT CHANGE UP
LEUKOCYTE ESTERASE UR-ACNC: NEGATIVE — SIGNIFICANT CHANGE UP
LIDOCAIN IGE QN: 23 U/L — SIGNIFICANT CHANGE UP (ref 7–60)
LYMPHOCYTES # BLD AUTO: 2.78 K/UL — SIGNIFICANT CHANGE UP (ref 1–3.3)
LYMPHOCYTES # BLD AUTO: 29.5 % — SIGNIFICANT CHANGE UP (ref 13–44)
MCHC RBC-ENTMCNC: 31.9 PG — SIGNIFICANT CHANGE UP (ref 27–34)
MCHC RBC-ENTMCNC: 33.3 G/DL — SIGNIFICANT CHANGE UP (ref 32–36)
MCV RBC AUTO: 95.6 FL — SIGNIFICANT CHANGE UP (ref 80–100)
MONOCYTES # BLD AUTO: 0.51 K/UL — SIGNIFICANT CHANGE UP (ref 0–0.9)
MONOCYTES NFR BLD AUTO: 5.4 % — SIGNIFICANT CHANGE UP (ref 2–14)
NEUTROPHILS # BLD AUTO: 5.75 K/UL — SIGNIFICANT CHANGE UP (ref 1.8–7.4)
NEUTROPHILS NFR BLD AUTO: 61.1 % — SIGNIFICANT CHANGE UP (ref 43–77)
NITRITE UR-MCNC: NEGATIVE — SIGNIFICANT CHANGE UP
NRBC # BLD AUTO: 0 K/UL — SIGNIFICANT CHANGE UP (ref 0–0)
NRBC # FLD: 0 K/UL — SIGNIFICANT CHANGE UP (ref 0–0)
NRBC BLD AUTO-RTO: 0 /100 WBCS — SIGNIFICANT CHANGE UP (ref 0–0)
PH UR: 6 — SIGNIFICANT CHANGE UP (ref 5–8)
PLATELET # BLD AUTO: 252 K/UL — SIGNIFICANT CHANGE UP (ref 150–400)
POTASSIUM SERPL-MCNC: 4.2 MMOL/L — SIGNIFICANT CHANGE UP (ref 3.5–5.3)
POTASSIUM SERPL-SCNC: 4.2 MMOL/L — SIGNIFICANT CHANGE UP (ref 3.5–5.3)
PROT SERPL-MCNC: 6.5 G/DL — SIGNIFICANT CHANGE UP (ref 6–8.3)
PROT UR-MCNC: SIGNIFICANT CHANGE UP MG/DL
RBC # BLD: 3.39 M/UL — LOW (ref 3.8–5.2)
RBC # FLD: 12.5 % — SIGNIFICANT CHANGE UP (ref 10.3–14.5)
SODIUM SERPL-SCNC: 134 MMOL/L — LOW (ref 135–145)
SP GR SPEC: 1.01 — SIGNIFICANT CHANGE UP (ref 1–1.03)
UROBILINOGEN FLD QL: 0.2 MG/DL — SIGNIFICANT CHANGE UP (ref 0.2–1)
WBC # BLD: 9.42 K/UL — SIGNIFICANT CHANGE UP (ref 3.8–10.5)
WBC # FLD AUTO: 9.42 K/UL — SIGNIFICANT CHANGE UP (ref 3.8–10.5)

## 2025-05-05 PROCEDURE — 76815 OB US LIMITED FETUS(S): CPT | Mod: 26

## 2025-05-05 PROCEDURE — 76817 TRANSVAGINAL US OBSTETRIC: CPT | Mod: 26

## 2025-05-05 PROCEDURE — 99221 1ST HOSP IP/OBS SF/LOW 40: CPT

## 2025-05-05 PROCEDURE — L9996: CPT

## 2025-05-05 NOTE — OB PROVIDER TRIAGE NOTE - HISTORY OF PRESENT ILLNESS
35yo female  @21.0 wks SLIUP uncomp PNC here complaining of pain in the epigastric area that has occurred twice, once last week and once this morning. Pt describes it as point pain in that area that feels like "a wave" and lasted an hour this morning. It was accompanied by some nausea with one episode of vomiting. Pt also complaining of RLQ pain when urinating x 1 yesterday and lower abdominal pain this after morning after sexual intercourse last night. Pt denies fever/ chills. Eating and drinking normally today. Pt also reports she also she was told she had protein in the urine and is scheduled to complete a 24h urine collection. No hx elevated BP's and is asx for pre-eclampsia.    PNC with Dr Ace[facility] [place] and is scheduled to deliver at [facility]

## 2025-05-05 NOTE — OB RN TRIAGE NOTE - AS TEMP SITE
Nurse note from patient's weekly VLCD / LCD / Maintenance class was reviewed. Pertinent medical concerns were:  None noted.      Continue current regimen oral

## 2025-05-05 NOTE — OB PROVIDER TRIAGE NOTE - NSHPLABSRESULTS_GEN_ALL_CORE
UA- trace protein                10.8  9.42>-----------<252               32.4 UA- trace protein                10.8  9.42>-----------<252               32.4      134 I 102 I 6  ----------------<74   AST-15; ALT-10; amylse- 79; lipase-23  4.2 I 20 I 0.48

## 2025-05-05 NOTE — OB RN TRIAGE NOTE - NSICDXNOPASTSURGICALHX_GEN_ALL_CORE
Detail Level: Simple Plan: If patient notes any recurrence she will return for evaluation and possible treatment. Will recheck in a month at skin check. <-- Click to add NO significant Past Surgical History

## 2025-05-05 NOTE — OB PROVIDER TRIAGE NOTE - NSHPPHYSICALEXAM_GEN_ALL_CORE
ICU Vital Signs Last 24 Hrs  T(C): 36.9 (05 May 2025 17:05), Max: 36.9 (05 May 2025 16:49)  T(F): 98.42 (05 May 2025 17:05), Max: 98.42 (05 May 2025 17:05)  HR: 77 (05 May 2025 17:50) (77 - 105)  BP: 111/66 (05 May 2025 17:50) (111/66 - 127/77)  BP(mean): --  ABP: --  ABP(mean): --  RR: 16 (05 May 2025 17:05) (16 - 17)  SpO2: 99% (05 May 2025 16:49) (99% - 100%)    O2 Parameters below as of 05 May 2025 16:49  Patient On (Oxygen Delivery Method): room air      General: Female sitting comfortably   Neuro: No facial asymmetry, no slurred speech, moves all 4 extremities  Mood: Alert and lucid, appropriate mood and affect  Head: Normocephalic. Atraumatic.   Eyes: No discharge, lids normal, conjunctiva normal  Lungs: No respiratory distress  Abdomen: Soft, nontender. Gravid. No contractions on palpation; Hernandez's sign negative    Ida- no ctx's  Abd sono- Images and report in [facility]OB- breech; posterior placenta; MVP-6.22; FH-161  TVS- Images and report in ASOB-3.59-4.10 cm; no funnel/dynamic changes

## 2025-05-05 NOTE — OB RN TRIAGE NOTE - NSDCTEMPCELSIUS_OBGYN_A_OB_CAL
C3 nurse attempted to contact patient. The following occurred:   C3 nurse attempted to contact Tadeo Riley for a TCC post hospital discharge follow up call. The patient is unable to conduct the call @ this time. The patient requested a callback.    The patient does not have a scheduled HOSFU appointment within 7-14 days post hospital discharge date 5/11/18.     36.8

## 2025-05-05 NOTE — OB PROVIDER TRIAGE NOTE - NSOBPROVIDERNOTE_OBGYN_ALL_OB_FT
35yo female  @ 21.0 wks SLIUP here complaining of epigastric pain x 2 episodes with lower abd cramping  -Abd sono and TVS reassuring  -Pt discussed with Dr Orozco PGY4  -UA/ CBC/ CMP/amylase/lipase ordered 33yo female  @ 21.0 wks SLIUP here complaining of epigastric pain x 2 episodes with lower abd cramping  -Abd sono and TVS reassuring  -Pt discussed with Dr Orozco PGY4  -UA/ CBC/ CMP/amylase/lipase ordered  -------------  19:00-pt signed out to night team 35yo female  @ 21.0 wks SLIUP here complaining of epigastric pain x 2 episodes with lower abd cramping  -Abd sono and TVS reassuring  -Pt discussed with Dr Orozco PGY4  -UA/ CBC/ CMP/amylase/lipase ordered  -------------  19:00-labs normal with no acute pathology   -pt was discussed with Dr Echols and pt was cleared for discharge home with instructions to follow up with her ob within the next 2 weeks  -pt was discharged home at 1905  - Please follow up with your obstetrician at your next scheduled appointment.   - Please return for decreased/no fetal movement, vaginal bleeding similar to that of a period, leaking/gush of fluid, regular contractions or requiring pain medication   - Patient and partner and educated of plan and demonstrate understanding. All questions answered. Discharge instructions provided and signed.

## 2025-05-07 DIAGNOSIS — O26.892 OTHER SPECIFIED PREGNANCY RELATED CONDITIONS, SECOND TRIMESTER: ICD-10-CM

## 2025-05-07 DIAGNOSIS — O21.2 LATE VOMITING OF PREGNANCY: ICD-10-CM

## 2025-05-07 DIAGNOSIS — O99.512 DISEASES OF THE RESPIRATORY SYSTEM COMPLICATING PREGNANCY, SECOND TRIMESTER: ICD-10-CM

## 2025-05-07 DIAGNOSIS — J45.909 UNSPECIFIED ASTHMA, UNCOMPLICATED: ICD-10-CM

## 2025-05-07 DIAGNOSIS — R10.13 EPIGASTRIC PAIN: ICD-10-CM

## 2025-05-07 DIAGNOSIS — Z3A.21 21 WEEKS GESTATION OF PREGNANCY: ICD-10-CM

## 2025-05-07 DIAGNOSIS — O09.292 SUPERVISION OF PREGNANCY WITH OTHER POOR REPRODUCTIVE OR OBSTETRIC HISTORY, SECOND TRIMESTER: ICD-10-CM

## 2025-06-09 ENCOUNTER — ASOB RESULT (OUTPATIENT)
Age: 35
End: 2025-06-09

## 2025-06-09 ENCOUNTER — APPOINTMENT (OUTPATIENT)
Dept: MATERNAL FETAL MEDICINE | Facility: CLINIC | Age: 35
End: 2025-06-09

## 2025-06-09 ENCOUNTER — APPOINTMENT (OUTPATIENT)
Dept: ANTEPARTUM | Facility: CLINIC | Age: 35
End: 2025-06-09
Payer: COMMERCIAL

## 2025-06-09 VITALS
SYSTOLIC BLOOD PRESSURE: 122 MMHG | BODY MASS INDEX: 37.03 KG/M2 | WEIGHT: 209 LBS | DIASTOLIC BLOOD PRESSURE: 74 MMHG | HEART RATE: 100 BPM | HEIGHT: 63 IN

## 2025-06-09 PROCEDURE — 76816 OB US FOLLOW-UP PER FETUS: CPT

## 2025-06-09 PROCEDURE — 99214 OFFICE O/P EST MOD 30 MIN: CPT

## 2025-06-14 ENCOUNTER — APPOINTMENT (OUTPATIENT)
Dept: ANTEPARTUM | Facility: CLINIC | Age: 35
End: 2025-06-14

## 2025-06-14 ENCOUNTER — ASOB RESULT (OUTPATIENT)
Age: 35
End: 2025-06-14

## 2025-06-14 ENCOUNTER — OUTPATIENT (OUTPATIENT)
Dept: INPATIENT UNIT | Facility: HOSPITAL | Age: 35
LOS: 1 days | End: 2025-06-14
Payer: COMMERCIAL

## 2025-06-14 VITALS
HEART RATE: 92 BPM | TEMPERATURE: 99 F | SYSTOLIC BLOOD PRESSURE: 121 MMHG | RESPIRATION RATE: 16 BRPM | DIASTOLIC BLOOD PRESSURE: 67 MMHG | OXYGEN SATURATION: 98 %

## 2025-06-14 DIAGNOSIS — O26.899 OTHER SPECIFIED PREGNANCY RELATED CONDITIONS, UNSPECIFIED TRIMESTER: ICD-10-CM

## 2025-06-14 LAB
APPEARANCE UR: CLEAR — SIGNIFICANT CHANGE UP
BILIRUB UR-MCNC: NEGATIVE — SIGNIFICANT CHANGE UP
COLOR SPEC: YELLOW — SIGNIFICANT CHANGE UP
DIFF PNL FLD: NEGATIVE — SIGNIFICANT CHANGE UP
GLUCOSE UR QL: NEGATIVE MG/DL — SIGNIFICANT CHANGE UP
KETONES UR QL: ABNORMAL MG/DL
LEUKOCYTE ESTERASE UR-ACNC: ABNORMAL
NITRITE UR-MCNC: NEGATIVE — SIGNIFICANT CHANGE UP
PH UR: 6.5 — SIGNIFICANT CHANGE UP (ref 5–8)
PROT UR-MCNC: 100 MG/DL
SP GR SPEC: 1.03 — SIGNIFICANT CHANGE UP (ref 1–1.03)
UROBILINOGEN FLD QL: 1 MG/DL — SIGNIFICANT CHANGE UP (ref 0.2–1)

## 2025-06-14 PROCEDURE — 59025 FETAL NON-STRESS TEST: CPT | Mod: 26

## 2025-06-14 PROCEDURE — 99221 1ST HOSP IP/OBS SF/LOW 40: CPT | Mod: 25

## 2025-06-14 PROCEDURE — 76817 TRANSVAGINAL US OBSTETRIC: CPT | Mod: 26

## 2025-06-14 PROCEDURE — 76819 FETAL BIOPHYS PROFIL W/O NST: CPT | Mod: 26

## 2025-06-15 VITALS — SYSTOLIC BLOOD PRESSURE: 112 MMHG | HEART RATE: 93 BPM | DIASTOLIC BLOOD PRESSURE: 74 MMHG

## 2025-06-15 PROBLEM — F17.210 NICOTINE DEPENDENCE, CIGARETTES, UNCOMPLICATED: Chronic | Status: INACTIVE | Noted: 2018-04-07 | Resolved: 2025-06-14

## 2025-06-15 LAB
BACTERIA # UR AUTO: ABNORMAL /HPF
CAST: 0 /LPF — SIGNIFICANT CHANGE UP (ref 0–4)
RBC CASTS # UR COMP ASSIST: 0 /HPF — SIGNIFICANT CHANGE UP (ref 0–4)
SQUAMOUS # UR AUTO: 16 /HPF — HIGH (ref 0–5)
WBC UR QL: 4 /HPF — SIGNIFICANT CHANGE UP (ref 0–5)

## 2025-06-16 DIAGNOSIS — O12.12 GESTATIONAL PROTEINURIA, SECOND TRIMESTER: ICD-10-CM

## 2025-06-16 DIAGNOSIS — O99.512 DISEASES OF THE RESPIRATORY SYSTEM COMPLICATING PREGNANCY, SECOND TRIMESTER: ICD-10-CM

## 2025-06-16 DIAGNOSIS — O99.342 OTHER MENTAL DISORDERS COMPLICATING PREGNANCY, SECOND TRIMESTER: ICD-10-CM

## 2025-06-16 DIAGNOSIS — O26.852 SPOTTING COMPLICATING PREGNANCY, SECOND TRIMESTER: ICD-10-CM

## 2025-06-16 DIAGNOSIS — F32.A DEPRESSION, UNSPECIFIED: ICD-10-CM

## 2025-06-16 DIAGNOSIS — Z3A.26 26 WEEKS GESTATION OF PREGNANCY: ICD-10-CM

## 2025-06-16 DIAGNOSIS — O09.292 SUPERVISION OF PREGNANCY WITH OTHER POOR REPRODUCTIVE OR OBSTETRIC HISTORY, SECOND TRIMESTER: ICD-10-CM

## 2025-06-16 DIAGNOSIS — J45.909 UNSPECIFIED ASTHMA, UNCOMPLICATED: ICD-10-CM

## 2025-06-23 PROBLEM — F41.9 ANXIETY DISORDER, UNSPECIFIED: Chronic | Status: ACTIVE | Noted: 2025-06-14

## 2025-06-26 ENCOUNTER — APPOINTMENT (OUTPATIENT)
Dept: ANTEPARTUM | Facility: CLINIC | Age: 35
End: 2025-06-26

## 2025-07-08 ENCOUNTER — APPOINTMENT (OUTPATIENT)
Dept: PULMONOLOGY | Facility: CLINIC | Age: 35
End: 2025-07-08
Payer: COMMERCIAL

## 2025-07-08 VITALS
TEMPERATURE: 97.8 F | HEIGHT: 63 IN | HEART RATE: 93 BPM | RESPIRATION RATE: 16 BRPM | BODY MASS INDEX: 37.39 KG/M2 | OXYGEN SATURATION: 96 % | DIASTOLIC BLOOD PRESSURE: 70 MMHG | WEIGHT: 211 LBS | SYSTOLIC BLOOD PRESSURE: 102 MMHG

## 2025-07-08 PROBLEM — Z3A.27 27 WEEKS GESTATION OF PREGNANCY: Status: ACTIVE | Noted: 2025-07-08

## 2025-07-08 PROCEDURE — 99214 OFFICE O/P EST MOD 30 MIN: CPT

## 2025-07-08 RX ORDER — BLOOD SUGAR DIAGNOSTIC
STRIP MISCELLANEOUS 4 TIMES DAILY
Qty: 90 | Refills: 4 | Status: ACTIVE | COMMUNITY
Start: 2025-07-08 | End: 1900-01-01

## 2025-07-08 RX ORDER — LANCETS
EACH MISCELLANEOUS
Qty: 2 | Refills: 2 | Status: ACTIVE | COMMUNITY
Start: 2025-07-08 | End: 1900-01-01

## 2025-07-08 RX ORDER — ALBUTEROL SULFATE 2.5 MG/3ML
(2.5 MG/3ML) SOLUTION RESPIRATORY (INHALATION)
Qty: 1 | Refills: 5 | Status: ACTIVE | COMMUNITY
Start: 2025-07-08 | End: 1900-01-01

## 2025-07-08 RX ORDER — BLOOD-GLUCOSE METER
W/DEVICE EACH MISCELLANEOUS
Qty: 1 | Refills: 0 | Status: ACTIVE | COMMUNITY
Start: 2025-07-08 | End: 1900-01-01

## 2025-07-10 ENCOUNTER — ASOB RESULT (OUTPATIENT)
Age: 35
End: 2025-07-10

## 2025-07-10 ENCOUNTER — APPOINTMENT (OUTPATIENT)
Dept: OBGYN | Facility: CLINIC | Age: 35
End: 2025-07-10
Payer: COMMERCIAL

## 2025-07-10 ENCOUNTER — APPOINTMENT (OUTPATIENT)
Dept: ANTEPARTUM | Facility: CLINIC | Age: 35
End: 2025-07-10

## 2025-07-10 ENCOUNTER — APPOINTMENT (OUTPATIENT)
Dept: ANTEPARTUM | Facility: CLINIC | Age: 35
End: 2025-07-10
Payer: COMMERCIAL

## 2025-07-10 VITALS
SYSTOLIC BLOOD PRESSURE: 135 MMHG | BODY MASS INDEX: 39.51 KG/M2 | WEIGHT: 223 LBS | DIASTOLIC BLOOD PRESSURE: 84 MMHG | HEIGHT: 63 IN

## 2025-07-10 PROBLEM — Z76.89 ENCOUNTER TO ESTABLISH CARE WITH NEW DOCTOR: Status: ACTIVE | Noted: 2025-07-10

## 2025-07-10 PROCEDURE — 99214 OFFICE O/P EST MOD 30 MIN: CPT

## 2025-07-10 PROCEDURE — 36415 COLL VENOUS BLD VENIPUNCTURE: CPT

## 2025-07-10 PROCEDURE — 99204 OFFICE O/P NEW MOD 45 MIN: CPT

## 2025-07-10 PROCEDURE — 76816 OB US FOLLOW-UP PER FETUS: CPT

## 2025-07-15 ENCOUNTER — TRANSCRIPTION ENCOUNTER (OUTPATIENT)
Age: 35
End: 2025-07-15

## 2025-07-17 LAB
ABORH: NORMAL
ANTIBODY SCREEN: NORMAL
AR GENE MUT ANL BLD/T: NORMAL
FMR1 GENE MUT ANL BLD/T: NORMAL

## 2025-07-18 LAB
ESTIMATED AVERAGE GLUCOSE: 103 MG/DL
FERRITIN SERPL-MCNC: 101 NG/ML
FOLATE SERPL-MCNC: 14.9 NG/ML
HBA1C MFR BLD HPLC: 5.2 %
IRON SATN MFR SERPL: 19 %
IRON SERPL-MCNC: 78 UG/DL
M TB IFN-G BLD-IMP: NEGATIVE
MEV IGG FLD QL IA: 18.8 AU/ML
MEV IGG+IGM SER-IMP: POSITIVE
MUV AB SER-ACNC: NEGATIVE
MUV IGG SER QL IA: <5 AU/ML
QUANTIFERON TB PLUS MITOGEN MINUS NIL: 4.17 IU/ML
QUANTIFERON TB PLUS NIL: 0.02 IU/ML
QUANTIFERON TB PLUS TB1 MINUS NIL: 0 IU/ML
QUANTIFERON TB PLUS TB2 MINUS NIL: 0 IU/ML
RUBV IGG FLD-ACNC: 0.73 INDEX
RUBV IGG SER-IMP: NEGATIVE
TIBC SERPL-MCNC: 401 UG/DL
UIBC SERPL-MCNC: 323 UG/DL
VIT B12 SERPL-MCNC: 490 PG/ML

## 2025-07-20 PROBLEM — O99.013 ANEMIA DURING PREGNANCY IN THIRD TRIMESTER: Status: ACTIVE | Noted: 2025-07-20

## 2025-07-20 LAB
CFTR MUT TESTED BLD/T: NEGATIVE
HCT VFR BLD CALC: 36.2 %
HGB BLD-MCNC: 10.8 G/DL
MCHC RBC-ENTMCNC: 29.8 G/DL
MCHC RBC-ENTMCNC: 30.9 PG
MCV RBC AUTO: 103.7 FL
PLATELET # BLD AUTO: 268 K/UL
RBC # BLD: 3.49 M/UL
RBC # FLD: 13.8 %
WBC # FLD AUTO: 9.99 K/UL

## 2025-07-24 ENCOUNTER — APPOINTMENT (OUTPATIENT)
Dept: OBGYN | Facility: CLINIC | Age: 35
End: 2025-07-24
Payer: COMMERCIAL

## 2025-07-24 VITALS
BODY MASS INDEX: 39.69 KG/M2 | DIASTOLIC BLOOD PRESSURE: 79 MMHG | HEIGHT: 63 IN | SYSTOLIC BLOOD PRESSURE: 120 MMHG | WEIGHT: 224 LBS

## 2025-07-24 DIAGNOSIS — F41.9 ANXIETY DISORDER, UNSPECIFIED: ICD-10-CM

## 2025-07-24 PROCEDURE — 0502F SUBSEQUENT PRENATAL CARE: CPT

## 2025-07-31 ENCOUNTER — APPOINTMENT (OUTPATIENT)
Dept: OBGYN | Facility: CLINIC | Age: 35
End: 2025-07-31
Payer: COMMERCIAL

## 2025-07-31 ENCOUNTER — TRANSCRIPTION ENCOUNTER (OUTPATIENT)
Age: 35
End: 2025-07-31

## 2025-07-31 DIAGNOSIS — N89.8 OTHER SPECIFIED NONINFLAMMATORY DISORDERS OF VAGINA: ICD-10-CM

## 2025-07-31 DIAGNOSIS — O24.419 GESTATIONAL DIABETES MELLITUS IN PREGNANCY, UNSPECIFIED CONTROL: ICD-10-CM

## 2025-07-31 PROCEDURE — ZZZZZ: CPT

## 2025-07-31 RX ORDER — CLOTRIMAZOLE AND BETAMETHASONE DIPROPIONATE 10; .5 MG/G; MG/G
1-0.05 CREAM TOPICAL TWICE DAILY
Qty: 1 | Refills: 0 | Status: ACTIVE | COMMUNITY
Start: 2025-07-31 | End: 1900-01-01

## 2025-07-31 RX ORDER — TERCONAZOLE 4 MG/G
0.4 CREAM VAGINAL DAILY
Qty: 1 | Refills: 0 | Status: ACTIVE | COMMUNITY
Start: 2025-07-31 | End: 1900-01-01

## 2025-08-05 ENCOUNTER — APPOINTMENT (OUTPATIENT)
Dept: OBGYN | Facility: CLINIC | Age: 35
End: 2025-08-05

## 2025-08-07 ENCOUNTER — APPOINTMENT (OUTPATIENT)
Dept: ANTEPARTUM | Facility: CLINIC | Age: 35
End: 2025-08-07

## 2025-08-15 ENCOUNTER — APPOINTMENT (OUTPATIENT)
Dept: ANTEPARTUM | Facility: CLINIC | Age: 35
End: 2025-08-15

## 2025-08-18 ENCOUNTER — APPOINTMENT (OUTPATIENT)
Dept: ANTEPARTUM | Facility: CLINIC | Age: 35
End: 2025-08-18
Payer: COMMERCIAL

## 2025-08-18 ENCOUNTER — ASOB RESULT (OUTPATIENT)
Age: 35
End: 2025-08-18

## 2025-08-18 ENCOUNTER — APPOINTMENT (OUTPATIENT)
Dept: OBGYN | Facility: CLINIC | Age: 35
End: 2025-08-18

## 2025-08-18 VITALS
DIASTOLIC BLOOD PRESSURE: 65 MMHG | BODY MASS INDEX: 39.34 KG/M2 | SYSTOLIC BLOOD PRESSURE: 128 MMHG | WEIGHT: 222 LBS | HEIGHT: 63 IN

## 2025-08-18 DIAGNOSIS — Z3A.36 36 WEEKS GESTATION OF PREGNANCY: ICD-10-CM

## 2025-08-18 PROCEDURE — 76819 FETAL BIOPHYS PROFIL W/O NST: CPT | Mod: 59

## 2025-08-18 PROCEDURE — 76816 OB US FOLLOW-UP PER FETUS: CPT

## 2025-08-18 PROCEDURE — 36415 COLL VENOUS BLD VENIPUNCTURE: CPT

## 2025-08-18 PROCEDURE — 0502F SUBSEQUENT PRENATAL CARE: CPT

## 2025-08-18 RX ORDER — VALACYCLOVIR 1 G/1
1 TABLET, FILM COATED ORAL DAILY
Qty: 30 | Refills: 2 | Status: ACTIVE | COMMUNITY
Start: 2025-08-18 | End: 1900-01-01

## 2025-08-19 LAB
BASOPHILS # BLD AUTO: 0.02 K/UL
BASOPHILS NFR BLD AUTO: 0.2 %
EOSINOPHIL # BLD AUTO: 0.33 K/UL
EOSINOPHIL NFR BLD AUTO: 3.7 %
HCT VFR BLD CALC: 40.7 %
HGB BLD-MCNC: 12.4 G/DL
HIV1+2 AB SPEC QL IA.RAPID: NONREACTIVE
IMM GRANULOCYTES NFR BLD AUTO: 0.6 %
LYMPHOCYTES # BLD AUTO: 2.11 K/UL
LYMPHOCYTES NFR BLD AUTO: 23.8 %
MAN DIFF?: NORMAL
MCHC RBC-ENTMCNC: 30.5 G/DL
MCHC RBC-ENTMCNC: 30.6 PG
MCV RBC AUTO: 100.5 FL
MONOCYTES # BLD AUTO: 0.43 K/UL
MONOCYTES NFR BLD AUTO: 4.9 %
NEUTROPHILS # BLD AUTO: 5.92 K/UL
NEUTROPHILS NFR BLD AUTO: 66.8 %
PLATELET # BLD AUTO: 283 K/UL
RBC # BLD: 4.05 M/UL
RBC # FLD: 13.9 %
WBC # FLD AUTO: 8.86 K/UL

## 2025-08-20 ENCOUNTER — NON-APPOINTMENT (OUTPATIENT)
Age: 35
End: 2025-08-20

## 2025-08-20 LAB
GP B STREP DNA SPEC QL NAA+PROBE: DETECTED
SOURCE GBS: NORMAL

## 2025-08-24 LAB — T PALLIDUM AB SER QL IA: NEGATIVE

## 2025-08-25 ENCOUNTER — TRANSCRIPTION ENCOUNTER (OUTPATIENT)
Age: 35
End: 2025-08-25

## 2025-08-25 ENCOUNTER — ASOB RESULT (OUTPATIENT)
Age: 35
End: 2025-08-25

## 2025-08-25 ENCOUNTER — APPOINTMENT (OUTPATIENT)
Dept: OBGYN | Facility: CLINIC | Age: 35
End: 2025-08-25
Payer: COMMERCIAL

## 2025-08-25 VITALS
HEIGHT: 63 IN | DIASTOLIC BLOOD PRESSURE: 83 MMHG | BODY MASS INDEX: 40.04 KG/M2 | WEIGHT: 226 LBS | SYSTOLIC BLOOD PRESSURE: 130 MMHG

## 2025-08-25 PROCEDURE — 0502F SUBSEQUENT PRENATAL CARE: CPT

## 2025-08-26 ENCOUNTER — APPOINTMENT (OUTPATIENT)
Dept: MATERNAL FETAL MEDICINE | Facility: CLINIC | Age: 35
End: 2025-08-26

## 2025-08-26 ENCOUNTER — ASOB RESULT (OUTPATIENT)
Age: 35
End: 2025-08-26

## 2025-08-26 PROCEDURE — 98960 EDU&TRN PT SELF-MGMT NQHP 1: CPT | Mod: 95

## 2025-08-29 ENCOUNTER — NON-APPOINTMENT (OUTPATIENT)
Age: 35
End: 2025-08-29

## 2025-08-29 RX ORDER — INSULIN HUMAN 100 [IU]/ML
100 INJECTION, SUSPENSION SUBCUTANEOUS
Qty: 2 | Refills: 1 | Status: ACTIVE | COMMUNITY
Start: 2025-08-29 | End: 1900-01-01

## 2025-08-29 RX ORDER — PEN NEEDLE, DIABETIC 32GX 5/32"
32G X 4 MM NEEDLE, DISPOSABLE MISCELLANEOUS
Qty: 1 | Refills: 1 | Status: ACTIVE | COMMUNITY
Start: 2025-08-29 | End: 1900-01-01

## 2025-08-29 RX ORDER — ISOPROPYL ALCOHOL 70 ML/100ML
SWAB TOPICAL
Qty: 1 | Refills: 1 | Status: ACTIVE | COMMUNITY
Start: 2025-08-29 | End: 1900-01-01

## 2025-09-02 ENCOUNTER — TRANSCRIPTION ENCOUNTER (OUTPATIENT)
Age: 35
End: 2025-09-02

## 2025-09-04 ENCOUNTER — NON-APPOINTMENT (OUTPATIENT)
Age: 35
End: 2025-09-04

## 2025-09-04 ENCOUNTER — APPOINTMENT (OUTPATIENT)
Dept: ANTEPARTUM | Facility: CLINIC | Age: 35
End: 2025-09-04

## 2025-09-04 ENCOUNTER — APPOINTMENT (OUTPATIENT)
Dept: OBGYN | Facility: CLINIC | Age: 35
End: 2025-09-04

## 2025-09-04 VITALS
SYSTOLIC BLOOD PRESSURE: 124 MMHG | BODY MASS INDEX: 39.87 KG/M2 | DIASTOLIC BLOOD PRESSURE: 85 MMHG | HEIGHT: 63 IN | WEIGHT: 225 LBS

## 2025-09-04 PROCEDURE — 0502F SUBSEQUENT PRENATAL CARE: CPT

## 2025-09-04 PROCEDURE — 76816 OB US FOLLOW-UP PER FETUS: CPT

## 2025-09-04 PROCEDURE — 76818 FETAL BIOPHYS PROFILE W/NST: CPT

## 2025-09-08 ENCOUNTER — NON-APPOINTMENT (OUTPATIENT)
Age: 35
End: 2025-09-08

## 2025-09-09 ENCOUNTER — APPOINTMENT (OUTPATIENT)
Dept: OBGYN | Facility: CLINIC | Age: 35
End: 2025-09-09
Payer: COMMERCIAL

## 2025-09-09 ENCOUNTER — APPOINTMENT (OUTPATIENT)
Dept: ANTEPARTUM | Facility: CLINIC | Age: 35
End: 2025-09-09

## 2025-09-09 ENCOUNTER — ASOB RESULT (OUTPATIENT)
Age: 35
End: 2025-09-09

## 2025-09-09 VITALS
BODY MASS INDEX: 39.51 KG/M2 | SYSTOLIC BLOOD PRESSURE: 129 MMHG | WEIGHT: 223 LBS | DIASTOLIC BLOOD PRESSURE: 88 MMHG | HEIGHT: 63 IN

## 2025-09-09 PROCEDURE — 76818 FETAL BIOPHYS PROFILE W/NST: CPT

## 2025-09-09 PROCEDURE — 0502F SUBSEQUENT PRENATAL CARE: CPT

## 2025-09-11 ENCOUNTER — APPOINTMENT (OUTPATIENT)
Dept: ANTEPARTUM | Facility: CLINIC | Age: 35
End: 2025-09-11

## 2025-09-11 ENCOUNTER — APPOINTMENT (OUTPATIENT)
Dept: OBGYN | Facility: CLINIC | Age: 35
End: 2025-09-11

## 2025-09-11 ENCOUNTER — ASOB RESULT (OUTPATIENT)
Age: 35
End: 2025-09-11

## 2025-09-11 VITALS
SYSTOLIC BLOOD PRESSURE: 120 MMHG | WEIGHT: 226 LBS | DIASTOLIC BLOOD PRESSURE: 82 MMHG | HEIGHT: 63 IN | BODY MASS INDEX: 40.04 KG/M2

## 2025-09-11 PROCEDURE — 0502F SUBSEQUENT PRENATAL CARE: CPT

## 2025-09-11 PROCEDURE — 76818 FETAL BIOPHYS PROFILE W/NST: CPT

## 2025-09-11 RX ORDER — CLOTRIMAZOLE 1 G/100G
1 CREAM TOPICAL TWICE DAILY
Qty: 1 | Refills: 1 | Status: ACTIVE | COMMUNITY
Start: 2025-09-11 | End: 1900-01-01

## 2025-09-12 ENCOUNTER — ASOB RESULT (OUTPATIENT)
Age: 35
End: 2025-09-12

## 2025-09-12 ENCOUNTER — APPOINTMENT (OUTPATIENT)
Dept: ANTEPARTUM | Facility: CLINIC | Age: 35
End: 2025-09-12

## 2025-09-12 PROCEDURE — 76815 OB US LIMITED FETUS(S): CPT | Mod: 26

## 2025-09-13 ENCOUNTER — TRANSCRIPTION ENCOUNTER (OUTPATIENT)
Age: 35
End: 2025-09-13

## 2025-09-15 ENCOUNTER — APPOINTMENT (OUTPATIENT)
Dept: OBGYN | Facility: HOSPITAL | Age: 35
End: 2025-09-15

## 2025-09-19 ENCOUNTER — TRANSCRIPTION ENCOUNTER (OUTPATIENT)
Age: 35
End: 2025-09-19